# Patient Record
Sex: MALE | Race: WHITE | NOT HISPANIC OR LATINO | Employment: OTHER | URBAN - METROPOLITAN AREA
[De-identification: names, ages, dates, MRNs, and addresses within clinical notes are randomized per-mention and may not be internally consistent; named-entity substitution may affect disease eponyms.]

---

## 2023-03-15 DIAGNOSIS — B35.3 INFECTION, FUNGAL, LEFT FOOT: Primary | ICD-10-CM

## 2023-03-15 DIAGNOSIS — Z86.19 HISTORY OF ONYCHOMYCOSIS: ICD-10-CM

## 2023-03-15 RX ORDER — CICLOPIROX 80 MG/ML
SOLUTION TOPICAL ONCE
Status: DISCONTINUED | OUTPATIENT
Start: 2023-03-15 | End: 2023-03-27

## 2023-03-22 DIAGNOSIS — B35.3 TINEA PEDIS OF BOTH FEET: Primary | ICD-10-CM

## 2023-03-23 RX ORDER — CICLOPIROX 80 MG/ML
SOLUTION TOPICAL ONCE
Status: DISCONTINUED | OUTPATIENT
Start: 2023-03-23 | End: 2023-03-27

## 2023-03-27 DIAGNOSIS — B35.1 TOENAIL FUNGUS: Primary | ICD-10-CM

## 2023-03-27 RX ORDER — CICLOPIROX 80 MG/ML
SOLUTION TOPICAL ONCE
Status: DISCONTINUED | OUTPATIENT
Start: 2023-03-27 | End: 2023-04-04

## 2023-03-27 RX ORDER — MELOXICAM 15 MG/1
TABLET ORAL
COMMUNITY
Start: 2021-06-14 | End: 2023-10-04

## 2023-03-27 RX ORDER — SIMVASTATIN 40 MG/1
1 TABLET, FILM COATED ORAL DAILY
COMMUNITY
Start: 2019-06-05 | End: 2024-04-01 | Stop reason: SDUPTHER

## 2023-03-27 RX ORDER — VIT C/E/ZN/COPPR/LUTEIN/ZEAXAN 250MG-90MG
1 CAPSULE ORAL DAILY
COMMUNITY
Start: 2022-01-25

## 2023-03-27 RX ORDER — ASPIRIN 81 MG/1
1 TABLET ORAL DAILY
COMMUNITY
Start: 2022-01-25

## 2023-03-27 RX ORDER — LISINOPRIL 20 MG/1
1 TABLET ORAL DAILY
COMMUNITY
Start: 2014-10-29 | End: 2024-04-01 | Stop reason: SDUPTHER

## 2023-03-27 RX ORDER — PANTOPRAZOLE SODIUM 40 MG/1
1 TABLET, DELAYED RELEASE ORAL DAILY
COMMUNITY
Start: 2019-03-05 | End: 2023-06-05

## 2023-03-27 RX ORDER — CLOTRIMAZOLE AND BETAMETHASONE DIPROPIONATE 10; .64 MG/G; MG/G
CREAM TOPICAL 2 TIMES DAILY
COMMUNITY
Start: 2018-12-20 | End: 2024-01-22

## 2023-04-03 PROBLEM — J32.9 SINUSITIS: Status: ACTIVE | Noted: 2023-04-03

## 2023-04-03 PROBLEM — M25.552 HIP PAIN, ACUTE, LEFT: Status: ACTIVE | Noted: 2023-04-03

## 2023-04-03 PROBLEM — M54.9 LEFT-SIDED BACK PAIN: Status: ACTIVE | Noted: 2023-04-03

## 2023-04-03 PROBLEM — G47.00 INSOMNIA: Status: ACTIVE | Noted: 2023-04-03

## 2023-04-03 PROBLEM — R10.32 CHRONIC PAIN OF LEFT GROIN: Status: ACTIVE | Noted: 2023-04-03

## 2023-04-03 PROBLEM — R23.3 EASY BRUISING: Status: ACTIVE | Noted: 2023-04-03

## 2023-04-03 PROBLEM — N52.9 ERECTILE DYSFUNCTION: Status: ACTIVE | Noted: 2023-04-03

## 2023-04-03 PROBLEM — M16.9 DEGENERATIVE JOINT DISEASE (DJD) OF HIP: Status: ACTIVE | Noted: 2023-04-03

## 2023-04-03 PROBLEM — G89.29 CHRONIC PAIN OF LEFT GROIN: Status: ACTIVE | Noted: 2023-04-03

## 2023-04-03 PROBLEM — M79.672 FOOT PAIN, BILATERAL: Status: ACTIVE | Noted: 2023-04-03

## 2023-04-03 PROBLEM — J39.2 THROAT IRRITATION: Status: ACTIVE | Noted: 2023-04-03

## 2023-04-03 PROBLEM — G89.29 CHRONIC LOW BACK PAIN: Status: ACTIVE | Noted: 2023-04-03

## 2023-04-03 PROBLEM — M77.42 METATARSALGIA OF BOTH FEET: Status: ACTIVE | Noted: 2023-04-03

## 2023-04-03 PROBLEM — M10.9 GOUT: Status: ACTIVE | Noted: 2023-04-03

## 2023-04-03 PROBLEM — E55.9 VITAMIN D DEFICIENCY: Status: ACTIVE | Noted: 2023-04-03

## 2023-04-03 PROBLEM — S76.119A QUADRICEPS MUSCLE STRAIN: Status: ACTIVE | Noted: 2023-04-03

## 2023-04-03 PROBLEM — K21.00 GASTROESOPHAGEAL REFLUX DISEASE WITH ESOPHAGITIS: Status: ACTIVE | Noted: 2023-04-03

## 2023-04-03 PROBLEM — M54.16 LUMBAR NEURITIS: Status: ACTIVE | Noted: 2023-04-03

## 2023-04-03 PROBLEM — S16.1XXA CERVICAL STRAIN: Status: ACTIVE | Noted: 2023-04-03

## 2023-04-03 PROBLEM — I10 BENIGN HYPERTENSION: Status: ACTIVE | Noted: 2023-04-03

## 2023-04-03 PROBLEM — K40.20 BILATERAL INGUINAL HERNIA (BIH): Status: ACTIVE | Noted: 2023-04-03

## 2023-04-03 PROBLEM — S76.219A STRAIN OF GROIN: Status: ACTIVE | Noted: 2023-04-03

## 2023-04-03 PROBLEM — R21 RASH OF BACK: Status: ACTIVE | Noted: 2023-04-03

## 2023-04-03 PROBLEM — M16.0 OSTEOARTHRITIS, HIP, BILATERAL: Status: ACTIVE | Noted: 2023-04-03

## 2023-04-03 PROBLEM — R05.3 PERSISTENT COUGH: Status: ACTIVE | Noted: 2023-04-03

## 2023-04-03 PROBLEM — J06.9 UPPER RESPIRATORY INFECTION WITH COUGH AND CONGESTION: Status: ACTIVE | Noted: 2023-04-03

## 2023-04-03 PROBLEM — R52 PAIN OF LEFT SIDE OF BODY: Status: ACTIVE | Noted: 2023-04-03

## 2023-04-03 PROBLEM — M54.50 CHRONIC LOW BACK PAIN: Status: ACTIVE | Noted: 2023-04-03

## 2023-04-03 PROBLEM — I25.10 CORONARY ARTERY CALCIFICATION: Status: ACTIVE | Noted: 2023-04-03

## 2023-04-03 PROBLEM — M70.71 BURSITIS OF HIP, RIGHT: Status: ACTIVE | Noted: 2023-04-03

## 2023-04-03 PROBLEM — R21 GROIN RASH: Status: ACTIVE | Noted: 2023-04-03

## 2023-04-03 PROBLEM — D49.2 ABNORMAL SKIN GROWTH: Status: ACTIVE | Noted: 2023-04-03

## 2023-04-03 PROBLEM — J06.9 ACUTE URI: Status: ACTIVE | Noted: 2023-04-03

## 2023-04-03 PROBLEM — I25.84 CORONARY ARTERY CALCIFICATION: Status: ACTIVE | Noted: 2023-04-03

## 2023-04-03 PROBLEM — M17.11 RIGHT KNEE DJD: Status: ACTIVE | Noted: 2023-04-03

## 2023-04-03 PROBLEM — J20.9 ACUTE BRONCHITIS: Status: ACTIVE | Noted: 2023-04-03

## 2023-04-03 PROBLEM — S39.011A: Status: ACTIVE | Noted: 2023-04-03

## 2023-04-03 PROBLEM — R53.83 FATIGUE: Status: ACTIVE | Noted: 2023-04-03

## 2023-04-03 PROBLEM — S86.912A STRAIN OF LEFT KNEE AND LEG: Status: ACTIVE | Noted: 2023-04-03

## 2023-04-03 PROBLEM — H69.93 DYSFUNCTION OF BOTH EUSTACHIAN TUBES: Status: ACTIVE | Noted: 2023-04-03

## 2023-04-03 PROBLEM — S39.012A LUMBOSACRAL STRAIN: Status: ACTIVE | Noted: 2023-04-03

## 2023-04-03 PROBLEM — L08.9 INFECTION OF SKIN: Status: ACTIVE | Noted: 2023-04-03

## 2023-04-03 PROBLEM — E78.5 HYPERLIPIDEMIA: Status: ACTIVE | Noted: 2023-04-03

## 2023-04-03 PROBLEM — M77.8 HAND TENDONITIS: Status: ACTIVE | Noted: 2023-04-03

## 2023-04-03 PROBLEM — R93.1 ABNORMAL SCREENING CARDIAC CT: Status: ACTIVE | Noted: 2023-04-03

## 2023-04-03 PROBLEM — M51.9 LUMBOSACRAL DISC DISEASE: Status: ACTIVE | Noted: 2023-04-03

## 2023-04-03 PROBLEM — R07.89 ATYPICAL CHEST PAIN: Status: ACTIVE | Noted: 2023-04-03

## 2023-04-03 PROBLEM — L91.8 FIBROEPITHELIAL PAPILLOMA: Status: ACTIVE | Noted: 2023-04-03

## 2023-04-03 PROBLEM — M17.0 OSTEOARTHRITIS OF KNEES, BILATERAL: Status: ACTIVE | Noted: 2023-04-03

## 2023-04-03 PROBLEM — M51.26 LUMBAR HERNIATED DISC: Status: ACTIVE | Noted: 2023-04-03

## 2023-04-03 PROBLEM — K76.9 LIVER LESION: Status: ACTIVE | Noted: 2023-04-03

## 2023-04-03 PROBLEM — I10 ELEVATED BLOOD PRESSURE READING WITH DIAGNOSIS OF HYPERTENSION: Status: ACTIVE | Noted: 2023-04-03

## 2023-04-03 PROBLEM — K40.90 RIGHT INGUINAL HERNIA: Status: ACTIVE | Noted: 2023-04-03

## 2023-04-03 PROBLEM — Z96.642 STATUS POST LEFT HIP REPLACEMENT: Status: ACTIVE | Noted: 2023-04-03

## 2023-04-03 PROBLEM — R07.89 CHEST PAIN, ATYPICAL: Status: ACTIVE | Noted: 2023-04-03

## 2023-04-03 PROBLEM — J01.00 ACUTE MAXILLARY SINUSITIS: Status: ACTIVE | Noted: 2023-04-03

## 2023-04-03 PROBLEM — S20.212A: Status: ACTIVE | Noted: 2023-04-03

## 2023-04-03 PROBLEM — M79.671 FOOT PAIN, BILATERAL: Status: ACTIVE | Noted: 2023-04-03

## 2023-04-03 PROBLEM — M77.41 METATARSALGIA OF BOTH FEET: Status: ACTIVE | Noted: 2023-04-03

## 2023-04-03 PROBLEM — J40 BRONCHITIS: Status: ACTIVE | Noted: 2023-04-03

## 2023-04-03 PROBLEM — M54.16 ACUTE LUMBAR RADICULOPATHY: Status: ACTIVE | Noted: 2023-04-03

## 2023-04-03 PROBLEM — M17.12 PRIMARY OSTEOARTHRITIS OF LEFT KNEE: Status: ACTIVE | Noted: 2023-04-03

## 2023-04-03 PROBLEM — L98.9 SKIN LESION OF RIGHT LEG: Status: ACTIVE | Noted: 2023-04-03

## 2023-04-03 PROBLEM — M47.812 CERVICAL OSTEOARTHRITIS: Status: ACTIVE | Noted: 2023-04-03

## 2023-04-03 PROBLEM — M96.1 LUMBAR POSTLAMINECTOMY SYNDROME: Status: ACTIVE | Noted: 2023-04-03

## 2023-04-03 PROBLEM — E78.5 DYSLIPIDEMIA: Status: ACTIVE | Noted: 2023-04-03

## 2023-04-04 ENCOUNTER — OFFICE VISIT (OUTPATIENT)
Dept: PRIMARY CARE | Facility: CLINIC | Age: 73
End: 2023-04-04
Payer: MEDICARE

## 2023-04-04 VITALS
WEIGHT: 198 LBS | BODY MASS INDEX: 26.82 KG/M2 | TEMPERATURE: 98 F | HEART RATE: 66 BPM | RESPIRATION RATE: 18 BRPM | OXYGEN SATURATION: 94 % | SYSTOLIC BLOOD PRESSURE: 144 MMHG | DIASTOLIC BLOOD PRESSURE: 80 MMHG | HEIGHT: 72 IN

## 2023-04-04 DIAGNOSIS — E78.2 MIXED HYPERLIPIDEMIA: ICD-10-CM

## 2023-04-04 DIAGNOSIS — B35.1 TOENAIL FUNGUS: ICD-10-CM

## 2023-04-04 DIAGNOSIS — I10 BENIGN HYPERTENSION: ICD-10-CM

## 2023-04-04 DIAGNOSIS — R07.89 ATYPICAL CHEST PAIN: ICD-10-CM

## 2023-04-04 DIAGNOSIS — Z12.5 SCREENING PSA (PROSTATE SPECIFIC ANTIGEN): ICD-10-CM

## 2023-04-04 DIAGNOSIS — J01.00 ACUTE NON-RECURRENT MAXILLARY SINUSITIS: Primary | ICD-10-CM

## 2023-04-04 LAB
ALANINE AMINOTRANSFERASE (SGPT) (U/L) IN SER/PLAS: 12 U/L (ref 10–52)
ALBUMIN (G/DL) IN SER/PLAS: 4.2 G/DL (ref 3.4–5)
ALKALINE PHOSPHATASE (U/L) IN SER/PLAS: 35 U/L (ref 33–136)
ANION GAP IN SER/PLAS: 9 MMOL/L (ref 10–20)
ASPARTATE AMINOTRANSFERASE (SGOT) (U/L) IN SER/PLAS: 15 U/L (ref 9–39)
BILIRUBIN TOTAL (MG/DL) IN SER/PLAS: 0.7 MG/DL (ref 0–1.2)
CALCIUM (MG/DL) IN SER/PLAS: 9.2 MG/DL (ref 8.6–10.6)
CARBON DIOXIDE, TOTAL (MMOL/L) IN SER/PLAS: 31 MMOL/L (ref 21–32)
CHLORIDE (MMOL/L) IN SER/PLAS: 100 MMOL/L (ref 98–107)
CHOLESTEROL (MG/DL) IN SER/PLAS: 135 MG/DL (ref 0–199)
CHOLESTEROL IN HDL (MG/DL) IN SER/PLAS: 54.1 MG/DL
CHOLESTEROL/HDL RATIO: 2.5
CREATININE (MG/DL) IN SER/PLAS: 0.71 MG/DL (ref 0.5–1.3)
ERYTHROCYTE DISTRIBUTION WIDTH (RATIO) BY AUTOMATED COUNT: 12.9 % (ref 11.5–14.5)
ERYTHROCYTE MEAN CORPUSCULAR HEMOGLOBIN CONCENTRATION (G/DL) BY AUTOMATED: 33 G/DL (ref 32–36)
ERYTHROCYTE MEAN CORPUSCULAR VOLUME (FL) BY AUTOMATED COUNT: 93 FL (ref 80–100)
ERYTHROCYTES (10*6/UL) IN BLOOD BY AUTOMATED COUNT: 4.98 X10E12/L (ref 4.5–5.9)
GFR MALE: >90 ML/MIN/1.73M2
GLUCOSE (MG/DL) IN SER/PLAS: 96 MG/DL (ref 74–99)
HEMATOCRIT (%) IN BLOOD BY AUTOMATED COUNT: 46.3 % (ref 41–52)
HEMOGLOBIN (G/DL) IN BLOOD: 15.3 G/DL (ref 13.5–17.5)
LDL: 66 MG/DL (ref 0–99)
LEUKOCYTES (10*3/UL) IN BLOOD BY AUTOMATED COUNT: 7.5 X10E9/L (ref 4.4–11.3)
NRBC (PER 100 WBCS) BY AUTOMATED COUNT: 0 /100 WBC (ref 0–0)
PLATELETS (10*3/UL) IN BLOOD AUTOMATED COUNT: 172 X10E9/L (ref 150–450)
POTASSIUM (MMOL/L) IN SER/PLAS: 4.3 MMOL/L (ref 3.5–5.3)
PROSTATE SPECIFIC ANTIGEN,SCREEN: 1.81 NG/ML (ref 0–4)
PROTEIN TOTAL: 6.2 G/DL (ref 6.4–8.2)
SODIUM (MMOL/L) IN SER/PLAS: 136 MMOL/L (ref 136–145)
THYROTROPIN (MIU/L) IN SER/PLAS BY DETECTION LIMIT <= 0.05 MIU/L: 0.73 MIU/L (ref 0.44–3.98)
TRIGLYCERIDE (MG/DL) IN SER/PLAS: 75 MG/DL (ref 0–149)
UREA NITROGEN (MG/DL) IN SER/PLAS: 15 MG/DL (ref 6–23)
VLDL: 15 MG/DL (ref 0–40)

## 2023-04-04 PROCEDURE — 80061 LIPID PANEL: CPT

## 2023-04-04 PROCEDURE — 36415 COLL VENOUS BLD VENIPUNCTURE: CPT | Performed by: FAMILY MEDICINE

## 2023-04-04 PROCEDURE — 1036F TOBACCO NON-USER: CPT | Performed by: FAMILY MEDICINE

## 2023-04-04 PROCEDURE — 1159F MED LIST DOCD IN RCRD: CPT | Performed by: FAMILY MEDICINE

## 2023-04-04 PROCEDURE — 84153 ASSAY OF PSA TOTAL: CPT

## 2023-04-04 PROCEDURE — 1160F RVW MEDS BY RX/DR IN RCRD: CPT | Performed by: FAMILY MEDICINE

## 2023-04-04 PROCEDURE — 80053 COMPREHEN METABOLIC PANEL: CPT

## 2023-04-04 PROCEDURE — 3077F SYST BP >= 140 MM HG: CPT | Performed by: FAMILY MEDICINE

## 2023-04-04 PROCEDURE — 99214 OFFICE O/P EST MOD 30 MIN: CPT | Performed by: FAMILY MEDICINE

## 2023-04-04 PROCEDURE — 3079F DIAST BP 80-89 MM HG: CPT | Performed by: FAMILY MEDICINE

## 2023-04-04 PROCEDURE — 85027 COMPLETE CBC AUTOMATED: CPT

## 2023-04-04 PROCEDURE — 84443 ASSAY THYROID STIM HORMONE: CPT

## 2023-04-04 RX ORDER — CEPHALEXIN 500 MG/1
500 CAPSULE ORAL 2 TIMES DAILY
Qty: 14 CAPSULE | Refills: 0 | Status: SHIPPED | OUTPATIENT
Start: 2023-04-04 | End: 2023-04-11

## 2023-04-04 RX ORDER — CICLOPIROX 80 MG/ML
SOLUTION TOPICAL ONCE
Status: DISCONTINUED | OUTPATIENT
Start: 2023-04-04 | End: 2023-04-04

## 2023-04-04 ASSESSMENT — PAIN SCALES - GENERAL: PAINLEVEL: 0-NO PAIN

## 2023-04-04 NOTE — PROGRESS NOTES
Subjective   Edilson Live is a 72 y.o. male who presents for Follow-up (Patient complains of foot fungus, also has sinus problems).  HPI  ; patient is a 72-year-old male who is in with a few different complaints and problems, he needs his blood work rechecked, he wants to discuss a toenail fungus that he has, and he has sinus congestion and drainage.  Patient also recently saw pain management and did have a nerve block for his low back problem and he states it did help.  He has a follow-up appointment with pain management next week.  Patient did have previous lumbar spine surgery and also history of a hip replacement surgery.  He states his back was bothering him when he played golf and he is hoping the nerve block will help.      Objective ROS ; 10 systems were reviewed and the information is included in the HPI and no additional review of systems is indicated.    Physical Exam  Vitals and nursing note reviewed.   Constitutional:       Appearance: Normal appearance. He is normal weight.      Comments: Patient is alert and oriented in no acute distress.  Does have some sinus congestion and drainage.   HENT:      Head: Normocephalic.      Right Ear: Tympanic membrane and external ear normal.      Left Ear: Tympanic membrane and external ear normal.      Nose: Congestion present.      Comments: Nasal sinus congestion and sinusitis.     Mouth/Throat:      Mouth: Mucous membranes are moist.      Pharynx: Posterior oropharyngeal erythema present.      Comments: Posterior pharyngeal irritation and drainage.  Eyes:      Extraocular Movements: Extraocular movements intact.      Conjunctiva/sclera: Conjunctivae normal.      Pupils: Pupils are equal, round, and reactive to light.   Neck:      Comments: Occasional neck spasm and restriction of motion secondary to stress and tension.  Cardiovascular:      Rate and Rhythm: Normal rate and regular rhythm.      Pulses: Normal pulses.      Heart sounds: Normal heart sounds.       Comments: Heart rhythm is stable S1 and S2 are noted, no ectopics.  Pulmonary:      Effort: Pulmonary effort is normal.      Comments: Lungs are clear to auscultation.    Abdominal:      General: Bowel sounds are normal.      Palpations: Abdomen is soft.      Comments: Abdomen is soft and nontender, no masses noted.   Genitourinary:     Comments: Not examined  Musculoskeletal:         General: Normal range of motion.      Cervical back: Normal range of motion.      Comments: History of lumbar spine surgery many years ago, history of left hip replacement and stable.  Patient is seeing pain management for his low back pain which interferes with his golf game.   Skin:     General: Skin is warm.   Neurological:      General: No focal deficit present.      Mental Status: He is alert and oriented to person, place, and time. Mental status is at baseline.      Comments: No neurosensory deficits are noted.  Normal reflexes upper and lower extremities.   Psychiatric:         Mood and Affect: Mood normal.         Behavior: Behavior normal.         Thought Content: Thought content normal.         Judgment: Judgment normal.      Comments: Normal mood and affect.  Normal thought content and judgment.  No anxiety or depression.     PLAN  ; patient is a 72-year-old male who was evaluated for recheck on blood work, toenail fungus, and sinusitis.  He will be notified of his blood results in 4 days and further recommendations will be made at that time.  He also was prescribed a topical treatment for toenail fungus, Penlac.  Patient also had some sinus congestion with upper respiratory congestion and was prescribed Keflex 500 mg twice daily.  He otherwise is stable and will follow-up as needed pending the blood work results.    Problem List Items Addressed This Visit          Infectious/Inflammatory    Acute maxillary sinusitis - Primary    Relevant Medications    cephalexin (Keflex) 500 mg capsule     Other Visit Diagnoses        Benign hypertension        Relevant Orders    CBC    Comprehensive Metabolic Panel    Lipid Panel    Thyroid Stimulating Hormone    Mixed hyperlipidemia        Relevant Orders    CBC    Comprehensive Metabolic Panel    Atypical chest pain        Relevant Orders    CBC    Comprehensive Metabolic Panel    Lipid Panel    Thyroid Stimulating Hormone    Screening PSA (prostate specific antigen)        Relevant Orders    Prostate Specific Antigen, Screen    Toenail fungus                     Tanmay Rosas DO

## 2023-04-07 NOTE — RESULT ENCOUNTER NOTE
Sugar, kidney and liver function were all normal           red and white blood cell counts were normal             prostate level was normal at 1.81,    under 4 is normal          cholesterol was normal at 135 and triglycerides are normal at 75      just the protein level was a little bit low at 6.2     should be above 6.4 but I would not worry about that         overall blood work looks very good      keep up the good work.

## 2023-05-10 ENCOUNTER — OFFICE VISIT (OUTPATIENT)
Dept: PRIMARY CARE | Facility: CLINIC | Age: 73
End: 2023-05-10
Payer: MEDICARE

## 2023-05-10 VITALS
OXYGEN SATURATION: 93 % | TEMPERATURE: 99 F | DIASTOLIC BLOOD PRESSURE: 80 MMHG | WEIGHT: 204 LBS | HEART RATE: 78 BPM | BODY MASS INDEX: 27.63 KG/M2 | SYSTOLIC BLOOD PRESSURE: 134 MMHG | RESPIRATION RATE: 18 BRPM | HEIGHT: 72 IN

## 2023-05-10 DIAGNOSIS — M51.9 LUMBOSACRAL DISC DISEASE: Primary | ICD-10-CM

## 2023-05-10 DIAGNOSIS — M96.1 LUMBAR POSTLAMINECTOMY SYNDROME: ICD-10-CM

## 2023-05-10 PROCEDURE — 1159F MED LIST DOCD IN RCRD: CPT | Performed by: FAMILY MEDICINE

## 2023-05-10 PROCEDURE — 3079F DIAST BP 80-89 MM HG: CPT | Performed by: FAMILY MEDICINE

## 2023-05-10 PROCEDURE — 3075F SYST BP GE 130 - 139MM HG: CPT | Performed by: FAMILY MEDICINE

## 2023-05-10 PROCEDURE — 1036F TOBACCO NON-USER: CPT | Performed by: FAMILY MEDICINE

## 2023-05-10 PROCEDURE — 1160F RVW MEDS BY RX/DR IN RCRD: CPT | Performed by: FAMILY MEDICINE

## 2023-05-10 PROCEDURE — 99214 OFFICE O/P EST MOD 30 MIN: CPT | Performed by: FAMILY MEDICINE

## 2023-05-10 RX ORDER — CICLOPIROX 80 MG/ML
SOLUTION TOPICAL
COMMUNITY
Start: 2023-04-04

## 2023-05-10 ASSESSMENT — PAIN SCALES - GENERAL: PAINLEVEL: 0-NO PAIN

## 2023-05-10 NOTE — PROGRESS NOTES
Subjective   Edilson Live is a 72 y.o. male who presents for Follow-up (Pt reports lower back pain since 11/2022.).    HPI  : Patient is a 72-year-old male who is being evaluated today for his back pain.  Patient had lumbar back surgery in 2016 and has been stable until recently.  I did see him last November and sent him for physical therapy which she started on about November 23 and continued through March 22.  He also was referred to pain management and they did perform a lumbar nerve block which did give him some relief.  He states lately he has been playing more golf and he has been having more back problems with some right lumbar pain and right SI joint pain and radiation to the right buttocks.  Patient will be sent and scheduled for an MRI scan on to review to evaluate his problem since physical therapy did not help at his 1st nerve block provided some relief but he cannot have another nerve block until July.  Patient otherwise has been quite stable and tries to do stretching exercises and the exercises he was given from physical therapy.  He is a very active person does exercise at the recreation center and likes to play golf however lately his back has limited his activities.  Patient also wants to review his blood results from April 4 of this year.      Objective  :  10 systems were reviewed and the information is included in the HPI and no additional review of systems is indicated.    Physical Exam  Vitals and nursing note reviewed.   Constitutional:       Appearance: Normal appearance. He is normal weight.      Comments: Patient is alert and oriented in mild distress with right lumbar back pain.   HENT:      Head: Normocephalic.      Right Ear: Tympanic membrane and external ear normal.      Left Ear: Tympanic membrane and external ear normal.      Nose: Nose normal.      Mouth/Throat:      Mouth: Mucous membranes are moist.      Pharynx: Oropharynx is clear.   Eyes:      Extraocular Movements: Extraocular  movements intact.      Conjunctiva/sclera: Conjunctivae normal.      Pupils: Pupils are equal, round, and reactive to light.   Neck:      Comments: Occasional neck spasm and restriction of motion secondary to mild arthritis stress and tension.  Cardiovascular:      Rate and Rhythm: Normal rate and regular rhythm.      Pulses: Normal pulses.      Heart sounds: Normal heart sounds.      Comments: Heart rhythm is stable S1 and S2 are noted, no ectopics.  Patient denies chest pain or palpitations.  Pulmonary:      Effort: Pulmonary effort is normal.      Comments: Lungs are clear to auscultation.  Patient denies productive cough or hemoptysis.  No wheezing or congestion noted.  Abdominal:      General: Abdomen is flat. Bowel sounds are normal.      Palpations: Abdomen is soft.      Comments: Abdomen is soft and nontender, no hepatosplenomegaly.  Patient denies any flank pain, no abdominal rebound tenderness or guarding.   Genitourinary:     Comments: Not examined  Musculoskeletal:         General: Normal range of motion.      Cervical back: Normal range of motion.      Comments: Patient has restriction of motion lumbar spine more so on the right L3-L4-L5 with some radiation of pain to the right sacral iliac joint.  Patient has been in physical therapy from November 23 through March 22 and he still is having some problems especially when he plays golf.  He did have one nerve block which helped.  He is concerned about the problem and wants to find out what needs to be done to fix it.  MRI scan was ordered.   Skin:     General: Skin is warm.   Neurological:      General: No focal deficit present.      Mental Status: He is alert and oriented to person, place, and time. Mental status is at baseline.      Comments: Patient has some right lumbar pain which radiates to the right SI joint and right buttocks.  There is no peripheral neuropathy noted.  No extensor hallux is weakness noted.  Patient ambulates with stable condition.   He does have some positive straight leg raising on the right.  Physical therapy helped somewhat but he needs further evaluation with MRI scan.   Psychiatric:         Mood and Affect: Mood normal.         Behavior: Behavior normal.         Thought Content: Thought content normal.         Judgment: Judgment normal.      Comments: Normal mood and affect.  Normal thought content and decision making.  Denies anxiety or depression.     PLAN  : Patient is a 72-year-old male who was evaluated today primarily to review his previous blood results and also due to his back pain.  He has completed physical therapy from November 23 through March 22 with some relief but he still has pain in the right lower lumbar region with radiation to the right SI joint and right buttocks.  Patient had 1 spinal nerve block from pain management which did help somewhat however every time he plays golf it reoccurs.  He had previous back surgery in 2016 with good results.  He will need to have an MRI scan to further evaluate this problem so that a decision can be made whether he needs any further surgery or stick with the spinal blocks.  I did send a requisition for an MRI scan and we will wait and see if it is authorized.  He does have positive straight leg raising on the right side.  He has had to limit his activities due to the pain and discomfort.  Further recommendations will be made after the MRI scan is completed.    Problem List Items Addressed This Visit    None           Tanmay Rosas,

## 2023-05-30 DIAGNOSIS — R10.32 CHRONIC PAIN OF LEFT GROIN: ICD-10-CM

## 2023-05-30 DIAGNOSIS — G89.29 CHRONIC PAIN OF LEFT GROIN: ICD-10-CM

## 2023-06-04 DIAGNOSIS — K21.00 GASTRO-ESOPHAGEAL REFLUX DISEASE WITH ESOPHAGITIS, WITHOUT BLEEDING: ICD-10-CM

## 2023-06-05 RX ORDER — PANTOPRAZOLE SODIUM 40 MG/1
TABLET, DELAYED RELEASE ORAL
Qty: 90 TABLET | Refills: 1 | Status: SHIPPED | OUTPATIENT
Start: 2023-06-05 | End: 2024-01-22

## 2023-06-07 DIAGNOSIS — M51.9 LUMBOSACRAL DISC DISEASE: Primary | ICD-10-CM

## 2023-06-07 NOTE — RESULT ENCOUNTER NOTE
I discussed the results with the patient of the MRI.    And I will send a copy to Dr. Duke , pain management.  I also gave the patient a referral to neurosurgery.

## 2023-06-13 ENCOUNTER — APPOINTMENT (OUTPATIENT)
Dept: PRIMARY CARE | Facility: CLINIC | Age: 73
End: 2023-06-13
Payer: MEDICARE

## 2023-06-23 ENCOUNTER — HOSPITAL ENCOUNTER (OUTPATIENT)
Dept: DATA CONVERSION | Facility: HOSPITAL | Age: 73
End: 2023-06-23
Attending: ANESTHESIOLOGY | Admitting: ANESTHESIOLOGY
Payer: MEDICARE

## 2023-06-23 DIAGNOSIS — Z79.82 LONG TERM (CURRENT) USE OF ASPIRIN: ICD-10-CM

## 2023-06-23 DIAGNOSIS — M54.16 RADICULOPATHY, LUMBAR REGION: ICD-10-CM

## 2023-06-23 DIAGNOSIS — M54.50 LOW BACK PAIN, UNSPECIFIED: ICD-10-CM

## 2023-09-05 ENCOUNTER — OFFICE VISIT (OUTPATIENT)
Dept: PRIMARY CARE | Facility: CLINIC | Age: 73
End: 2023-09-05
Payer: MEDICARE

## 2023-09-05 VITALS
HEIGHT: 72 IN | TEMPERATURE: 98.4 F | DIASTOLIC BLOOD PRESSURE: 72 MMHG | SYSTOLIC BLOOD PRESSURE: 138 MMHG | RESPIRATION RATE: 18 BRPM | OXYGEN SATURATION: 93 % | WEIGHT: 196 LBS | BODY MASS INDEX: 26.55 KG/M2 | HEART RATE: 75 BPM

## 2023-09-05 DIAGNOSIS — M51.9 LUMBOSACRAL DISC DISEASE: ICD-10-CM

## 2023-09-05 DIAGNOSIS — R21 RASH: ICD-10-CM

## 2023-09-05 DIAGNOSIS — T14.8XXA BRUISING: ICD-10-CM

## 2023-09-05 DIAGNOSIS — Z96.642 STATUS POST LEFT HIP REPLACEMENT: ICD-10-CM

## 2023-09-05 DIAGNOSIS — I10 PRIMARY HYPERTENSION: ICD-10-CM

## 2023-09-05 DIAGNOSIS — S46.912A SHOULDER STRAIN, LEFT, INITIAL ENCOUNTER: Primary | ICD-10-CM

## 2023-09-05 DIAGNOSIS — E78.2 MIXED HYPERLIPIDEMIA: ICD-10-CM

## 2023-09-05 DIAGNOSIS — M17.12 PRIMARY OSTEOARTHRITIS OF LEFT KNEE: ICD-10-CM

## 2023-09-05 DIAGNOSIS — M96.1 LUMBAR POSTLAMINECTOMY SYNDROME: ICD-10-CM

## 2023-09-05 PROCEDURE — 1036F TOBACCO NON-USER: CPT | Performed by: FAMILY MEDICINE

## 2023-09-05 PROCEDURE — 1126F AMNT PAIN NOTED NONE PRSNT: CPT | Performed by: FAMILY MEDICINE

## 2023-09-05 PROCEDURE — 3075F SYST BP GE 130 - 139MM HG: CPT | Performed by: FAMILY MEDICINE

## 2023-09-05 PROCEDURE — 1160F RVW MEDS BY RX/DR IN RCRD: CPT | Performed by: FAMILY MEDICINE

## 2023-09-05 PROCEDURE — 1159F MED LIST DOCD IN RCRD: CPT | Performed by: FAMILY MEDICINE

## 2023-09-05 PROCEDURE — 3078F DIAST BP <80 MM HG: CPT | Performed by: FAMILY MEDICINE

## 2023-09-05 PROCEDURE — 99214 OFFICE O/P EST MOD 30 MIN: CPT | Performed by: FAMILY MEDICINE

## 2023-09-05 RX ORDER — CLOTRIMAZOLE AND BETAMETHASONE DIPROPIONATE 10; .64 MG/G; MG/G
CREAM TOPICAL DAILY
Status: CANCELLED | OUTPATIENT
Start: 2023-09-05

## 2023-09-05 ASSESSMENT — PAIN SCALES - GENERAL: PAINLEVEL: 0-NO PAIN

## 2023-09-05 NOTE — PROGRESS NOTES
Subjective   Edilson Live is a 73 y.o. male who presents for Follow-up (Patient complains of left shoulder discomfort).    HPI ; patient is a 73-year-old male who is being evaluated for left shoulder strain or sprain, he also recently had spinal nerve blocks for his lumbosacral disc disease.  He was also referred to a neurosurgeon Dr. Spicer for further evaluation and has a follow-up appointment soon.  Patient has been playing golf and he states occasionally his left shoulder gives him some pain and discomfort.  He states it is slowly improving with stretching and strengthening exercises and he does not think he needs a cortisone shot.  He also is seeing pain management for his lumbosacral disc disease and has an upcoming follow-up appointment with neurosurgery to see if he needs any other treatment.  Patient will have his recheck on blood work done in October when he comes back for his follow-up appointment.      Objective  : ROS :10 systems were reviewed and the information is included in the HPI and no additional review of systems is indicated.    Physical Exam  Vitals and nursing note reviewed.   Constitutional:       Appearance: Normal appearance. He is normal weight.      Comments: Patient is alert and oriented x3.   No acute distress.   HENT:      Head: Normocephalic.      Right Ear: Tympanic membrane and external ear normal.      Left Ear: Tympanic membrane and external ear normal.      Ears:      Comments: Ears are patent bilaterally and TMs are clear.     Nose: Nose normal.      Mouth/Throat:      Mouth: Mucous membranes are moist.      Pharynx: Oropharynx is clear.      Comments: Mouth is moist, tongue is midline.  No posterior pharyngeal erythema.  Eyes:      Extraocular Movements: Extraocular movements intact.      Conjunctiva/sclera: Conjunctivae normal.      Pupils: Pupils are equal, round, and reactive to light.      Comments: No visual disturbance, does have her eyes examined once a year.   Neck:       Comments: No carotid bruits, no thyromegaly, no cervical adenopathy.  Occasional neck spasm and restriction of motion secondary to mild arthritis, and muscle spasm.  Cardiovascular:      Rate and Rhythm: Normal rate and regular rhythm.      Pulses: Normal pulses.      Heart sounds: Normal heart sounds.      Comments: Patient denies chest pain and no palpitations.  Heart rhythm is stable S1 and S2 are noted, no ectopics.  Pulmonary:      Effort: Pulmonary effort is normal.      Breath sounds: Normal breath sounds.      Comments: Patient denies any coughing or wheezing.  Lungs are clear to auscultation.    Abdominal:      General: Bowel sounds are normal.      Palpations: Abdomen is soft.      Comments: Abdomen is soft and nontender, no hepatosplenomegaly.  Occasionally has some esophageal reflux.  No flank tenderness.  No suprapubic pain.  Positive bowel sounds x4.  No abdominal guarding and no rebound tenderness.   Genitourinary:     Comments: Patient denies dysuria, no hematuria, denies flank pain.  Occasional nocturia.  Musculoskeletal:         General: Tenderness present.      Cervical back: Normal range of motion.      Comments: Past history of lumbar spine surgery probably 8 years ago.  Currently seeing pain management and did see neurosurgery for consultation.  The physical therapy and nerve blocks did seem to help his low back pain.  He also has some left shoulder strain which exercises seem to be helping.  Age-related arthritis in the joints.  Restriction of motion cervical and lumbar spines due to arthritis, degenerative disc disease, and muscle spasm.  He did have a recent MRI scan that we did review of his lumbar spine.   Skin:     General: Skin is warm.      Findings: Bruising present.      Comments: Patient does seem to be bruising more and has several skin tears since he has gotten a little bit older and he does take a baby aspirin daily.  We will recheck his platelet count when he comes back in  October.  Patient is also very fair complected.   Neurological:      General: No focal deficit present.      Mental Status: He is alert and oriented to person, place, and time. Mental status is at baseline.      Comments: No focal neurosensory deficits are noted.  Patient denies any peripheral neuropathy.  Coordination and gait are stable.  Normal muscle strength upper and lower extremities.  Patient does have some sensory changes left lower leg from previous surgery on the lumbar spine.   Psychiatric:         Mood and Affect: Mood normal.         Behavior: Behavior normal.         Thought Content: Thought content normal.         Judgment: Judgment normal.      Comments: Patient has normal mood and affect.  Thought content and judgment are stable.  No signs of vascular dementia.  Behavior is normal.     PLAN : Patient is a 73-year-old male who was evaluated today for several concerns and problems.  He did have a left shoulder strain which probably occurred with his several recent golf outings.  He states he is doing some stretching exercises for the shoulder That Seems to Be Improving.  He Does Have Good Muscle Strength and He Does Not Need a Cortisone Shot or X-Rays at This Time.  He Also Had Some Right Infrapatellar Tendinitis.  He Can Rub Some Aspercreme with Lidocaine on the Right Knee Area and Hopefully That Will Continue to Improve.  He Also Had Several Small Skin Tears and Bruises and His Last Platelet Count Was a Little Bit Low.  This Will Be Rechecked in October When He Comes Back for His Blood Work.  Patient Also Is Seeing Pain Management for His Low Back Problems and We Did Review His Recent MRI scan and he does have a upcoming follow-up appointment with neurosurgery.  Patient seems to be stable otherwise and he was given some stretching exercises and he will return in October for his blood recheck.  Blood pressure and other vitals are stable.    Problem List Items Addressed This Visit       Status post  left hip replacement    Lumbar postlaminectomy syndrome    Lumbosacral disc disease    Primary osteoarthritis of left knee    Hyperlipidemia    Primary hypertension    Shoulder strain, left, initial encounter - Primary            Tanmay Rosas DO

## 2023-09-07 ENCOUNTER — APPOINTMENT (OUTPATIENT)
Dept: PRIMARY CARE | Facility: CLINIC | Age: 73
End: 2023-09-07
Payer: MEDICARE

## 2023-09-07 VITALS — BODY MASS INDEX: 27.69 KG/M2 | HEIGHT: 72 IN

## 2023-10-04 ENCOUNTER — OFFICE VISIT (OUTPATIENT)
Dept: PAIN MEDICINE | Facility: CLINIC | Age: 73
End: 2023-10-04
Payer: MEDICARE

## 2023-10-04 VITALS
BODY MASS INDEX: 26.68 KG/M2 | WEIGHT: 197 LBS | DIASTOLIC BLOOD PRESSURE: 84 MMHG | TEMPERATURE: 96.1 F | HEIGHT: 72 IN | HEART RATE: 80 BPM | SYSTOLIC BLOOD PRESSURE: 172 MMHG

## 2023-10-04 DIAGNOSIS — M51.26 LUMBAR HERNIATED DISC: ICD-10-CM

## 2023-10-04 DIAGNOSIS — M54.16 LUMBAR NEURITIS: ICD-10-CM

## 2023-10-04 DIAGNOSIS — M51.9 LUMBOSACRAL DISC DISEASE: Primary | ICD-10-CM

## 2023-10-04 PROCEDURE — 99214 OFFICE O/P EST MOD 30 MIN: CPT | Performed by: ANESTHESIOLOGY

## 2023-10-04 ASSESSMENT — PATIENT HEALTH QUESTIONNAIRE - PHQ9
2. FEELING DOWN, DEPRESSED OR HOPELESS: NOT AT ALL
1. LITTLE INTEREST OR PLEASURE IN DOING THINGS: NOT AT ALL
SUM OF ALL RESPONSES TO PHQ9 QUESTIONS 1 AND 2: 0

## 2023-10-04 ASSESSMENT — COLUMBIA-SUICIDE SEVERITY RATING SCALE - C-SSRS
6. HAVE YOU EVER DONE ANYTHING, STARTED TO DO ANYTHING, OR PREPARED TO DO ANYTHING TO END YOUR LIFE?: NO
1. IN THE PAST MONTH, HAVE YOU WISHED YOU WERE DEAD OR WISHED YOU COULD GO TO SLEEP AND NOT WAKE UP?: NO
2. HAVE YOU ACTUALLY HAD ANY THOUGHTS OF KILLING YOURSELF?: NO

## 2023-10-04 ASSESSMENT — PAIN SCALES - GENERAL: PAINLEVEL: 1

## 2023-10-04 NOTE — PROGRESS NOTES
History Of Present Illness  Edilson Live is a 73 y.o. male presenting with   Chief Complaint   Patient presents with    Back Pain     Right low back pain to the right hip down the thigh.     Patient returns for follow up for ONGOING IMPROVED chronic low back pain to the RLE thru his buttock and down the lateral right knee.    Recall: He is s/p below lumbar surgery Dr. Bernstein in 2016.     The pain is constant, worse with activity and better with rest. The pain is sharp, stabbing and shooting to the B/L LE. Denies LE paresthesias, weakness, saddle anesthesia, bowel or bladder incontinence. To manage this pain the patient has attempted Advil with 10-20% relief of his pain. The patients chronic GERD, HTN, DLD are stable.     PSHx  -Laminectomy, facetectomy and foraminotomy with Dr. Bernstein in 2016    -LTHA (Dr. Lopez)  -Hernia repair   -Left knee arthroscopy (Dr. Lopez)     SocHx  -Denies any tobacco  -Worked desk work in Finance at [employer]    PAIN SCORE: 5/10    Previous Pain: Dr. Guzman ([employer])     Past Medical History  He has a past medical history of Acute upper respiratory infection, unspecified (11/20/2017), Bicipital tendinitis, left shoulder, Effusion, right knee, Encounter for removal of sutures, Other benign neoplasm of skin of scalp and neck, Other conditions influencing health status, Other conditions influencing health status, Other specified disorders of the skin and subcutaneous tissue, Overweight, Personal history of other (healed) physical injury and trauma, Personal history of other diseases of the circulatory system, Personal history of other diseases of the musculoskeletal system and connective tissue, Personal history of other diseases of the respiratory system, Radiculopathy, cervical region, Reaction to severe stress, unspecified, Sprain of unspecified parts of thorax, initial encounter, and Strain of muscle, fascia and tendon at neck level, initial encounter (07/10/2018).    Surgical History  He  has a past surgical history that includes Other surgical history (01/07/2020); Other surgical history (01/07/2020); Other surgical history (01/07/2020); and Knee arthroscopy w/ debridement (07/12/2016).     Social History  He reports that he has never smoked. He has never been exposed to tobacco smoke. He has never used smokeless tobacco. He reports that he does not currently use alcohol after a past usage of about 5.0 standard drinks of alcohol per week. He reports that he does not use drugs.    Family History  Family History   Problem Relation Name Age of Onset    Arthritis Other multiple family members     Diabetes Other multiple family members     Hypertension Other multiple family members     Stroke Other multiple family members     Other (cardiac disorder) Other multiple family members     Lung disease Other multiple family members         Allergies  Chlorhexidin-isopropyl alcohol    Review of Systems     Physical Exam     Last Recorded Vitals  /84   Pulse 80   Temp 35.6 °C (96.1 °F)   Wt 89.4 kg (197 lb)     General: Pt appears stated age    Eyes: Conjunctiva non-icteric and lids without obvious rash or drooping. Pupils are symmetric    ENT: External ears are without deformity or rash. Hearing is grossly intact    Neck: No JVD noted, tracheal position midline.     Respiratory: No gasping or shortness of breath noted, no use of accessory muscles noted    Cardiovascular: Extremities show no edema or varicosities    Skin: No rashes or open lesions/ulcers identified on skin.     Musculoskeletal: Gait is grossly normal    Digits/nails show no clubbing or cyanosis    Exam of muscles/joints/bones shows no gross atrophy and no abnormal/involuntary movements in the head/neckNo asymmetry or masses noted in the head/neck    Stability: no subluxation noted on movement of bilateral upper extremities or head/neck    Strength: 4/5 in RLE , 5/5 in LLE     Range of Motion: WNL     Sensation: In tact     Cranial  nerves 2-12 are grossly intact    Psychiatric: Pt is alert and oriented to time, place and person.         Assessment/Plan   1. Lumbosacral disc disease        2. Lumbar neuritis        3. Lumbar herniated disc            1. I have previously provided the patient with a list of physical therapy exercises to learn and perform to strengthen core, maintain stabilization, and reduce pain. We reviewed the exercises in detail and I encouraged them to perform them on a regular basis.    Pt reports therapy has been working very well for him and he continues on this.     2. I did previously discuss starting him on Gabapentin to help with nerve related pain. We discussed the risks, benefits, and side effects to this medication including the mechanism of action and the pt understands and will hold off for now.     3. I extensively reviewed the patients MRI findings (2016 prior to surgery) in detail, including review of the actual images and provided a detailed explanation of the findings using a spine model.     There is mild diffuse disc bulge at the L3/4 level.     There is diffuse disc bulge and moderate to severe bilateral neural foraminal stenosis. There is also noted a 6mm perineural cyst at the L4 nerve root level.    At the L5/S1 moderate right and moderate to severe left neural foraminal stenosis and suspected pars defects at L5 level.     4. The patient is a candidate for an CAUDAL vs to treat back and radicular pain. I spent time with the patient discussing all of the risks, benefits, and alternatives to this measure. Including but not limited to spinal infection, epidural hematoma/abscess, paralysis, nerve injury, steroid effects, and spinal headache. The patient understands and agrees.     He underwent his injection on 3- and 60% relief of his pain that is ONGOING for now. He has been able to stand / walk / bend with much less pain.     5. I reviewed his MRI from [facility] from 5-2023.     I spent time with  the patient reviewing their imaging and discussing the risks benefits and alternatives to the above plan. A total of 30 minutes was spent reviewing the data and greater than 50% of that time was with the patient during the face to face encounter discussing treatment options both surgical, non-surgical, and minimally invasive techniques.      Vicente Duke MD

## 2023-11-22 ENCOUNTER — TELEPHONE (OUTPATIENT)
Dept: PRIMARY CARE | Facility: CLINIC | Age: 73
End: 2023-11-22
Payer: MEDICARE

## 2023-11-22 NOTE — TELEPHONE ENCOUNTER
Patient lvm today stating he has cough, congestion, sore throat. Would like to know if you could send something to his pharmacy ?

## 2023-11-24 DIAGNOSIS — J06.9 URI WITH COUGH AND CONGESTION: Primary | ICD-10-CM

## 2023-11-24 RX ORDER — BROMPHENIRAMINE MALEATE, PSEUDOEPHEDRINE HYDROCHLORIDE, AND DEXTROMETHORPHAN HYDROBROMIDE 2; 30; 10 MG/5ML; MG/5ML; MG/5ML
5 SYRUP ORAL 4 TIMES DAILY PRN
Qty: 120 ML | Refills: 0 | Status: SHIPPED | OUTPATIENT
Start: 2023-11-24 | End: 2023-12-04

## 2023-11-24 RX ORDER — AMOXICILLIN AND CLAVULANATE POTASSIUM 500; 125 MG/1; MG/1
500 TABLET, FILM COATED ORAL 3 TIMES DAILY
Qty: 30 TABLET | Refills: 0 | Status: SHIPPED | OUTPATIENT
Start: 2023-11-24 | End: 2023-12-04

## 2023-12-05 ENCOUNTER — OFFICE VISIT (OUTPATIENT)
Dept: PRIMARY CARE | Facility: CLINIC | Age: 73
End: 2023-12-05
Payer: MEDICARE

## 2023-12-05 VITALS
WEIGHT: 198 LBS | HEIGHT: 72 IN | OXYGEN SATURATION: 94 % | HEART RATE: 74 BPM | TEMPERATURE: 98 F | DIASTOLIC BLOOD PRESSURE: 80 MMHG | RESPIRATION RATE: 18 BRPM | SYSTOLIC BLOOD PRESSURE: 130 MMHG | BODY MASS INDEX: 26.82 KG/M2

## 2023-12-05 DIAGNOSIS — R07.89 ATYPICAL CHEST PAIN: ICD-10-CM

## 2023-12-05 DIAGNOSIS — I10 BENIGN HYPERTENSION: ICD-10-CM

## 2023-12-05 DIAGNOSIS — Z96.642 STATUS POST LEFT HIP REPLACEMENT: ICD-10-CM

## 2023-12-05 DIAGNOSIS — E55.9 VITAMIN D DEFICIENCY: ICD-10-CM

## 2023-12-05 DIAGNOSIS — E78.5 DYSLIPIDEMIA: ICD-10-CM

## 2023-12-05 DIAGNOSIS — M51.9 LUMBOSACRAL DISC DISEASE: ICD-10-CM

## 2023-12-05 DIAGNOSIS — U07.1 COVID-19: ICD-10-CM

## 2023-12-05 DIAGNOSIS — I10 PRIMARY HYPERTENSION: ICD-10-CM

## 2023-12-05 DIAGNOSIS — Z00.00 ROUTINE GENERAL MEDICAL EXAMINATION AT HEALTH CARE FACILITY: Primary | ICD-10-CM

## 2023-12-05 LAB
25(OH)D3 SERPL-MCNC: 44 NG/ML (ref 30–100)
ALBUMIN SERPL BCP-MCNC: 4.3 G/DL (ref 3.4–5)
ALP SERPL-CCNC: 70 U/L (ref 33–136)
ALT SERPL W P-5'-P-CCNC: 95 U/L (ref 10–52)
ANION GAP SERPL CALC-SCNC: 15 MMOL/L (ref 10–20)
AST SERPL W P-5'-P-CCNC: 63 U/L (ref 9–39)
BILIRUB SERPL-MCNC: 0.7 MG/DL (ref 0–1.2)
BUN SERPL-MCNC: 15 MG/DL (ref 6–23)
CALCIUM SERPL-MCNC: 9.5 MG/DL (ref 8.6–10.6)
CHLORIDE SERPL-SCNC: 97 MMOL/L (ref 98–107)
CHOLEST SERPL-MCNC: 140 MG/DL (ref 0–199)
CHOLESTEROL/HDL RATIO: 3.1
CO2 SERPL-SCNC: 30 MMOL/L (ref 21–32)
CREAT SERPL-MCNC: 0.8 MG/DL (ref 0.5–1.3)
ERYTHROCYTE [DISTWIDTH] IN BLOOD BY AUTOMATED COUNT: 12.5 % (ref 11.5–14.5)
GFR SERPL CREATININE-BSD FRML MDRD: >90 ML/MIN/1.73M*2
GLUCOSE SERPL-MCNC: 82 MG/DL (ref 74–99)
HCT VFR BLD AUTO: 45.8 % (ref 41–52)
HDLC SERPL-MCNC: 45.2 MG/DL
HGB BLD-MCNC: 15.6 G/DL (ref 13.5–17.5)
LDLC SERPL CALC-MCNC: 79 MG/DL
MCH RBC QN AUTO: 30.5 PG (ref 26–34)
MCHC RBC AUTO-ENTMCNC: 34.1 G/DL (ref 32–36)
MCV RBC AUTO: 90 FL (ref 80–100)
NON HDL CHOLESTEROL: 95 MG/DL (ref 0–149)
NRBC BLD-RTO: 0 /100 WBCS (ref 0–0)
PLATELET # BLD AUTO: 218 X10*3/UL (ref 150–450)
POTASSIUM SERPL-SCNC: 4.3 MMOL/L (ref 3.5–5.3)
PROT SERPL-MCNC: 6.6 G/DL (ref 6.4–8.2)
RBC # BLD AUTO: 5.11 X10*6/UL (ref 4.5–5.9)
SODIUM SERPL-SCNC: 138 MMOL/L (ref 136–145)
TRIGL SERPL-MCNC: 77 MG/DL (ref 0–149)
TSH SERPL-ACNC: 0.86 MIU/L (ref 0.44–3.98)
VLDL: 15 MG/DL (ref 0–40)
WBC # BLD AUTO: 8.1 X10*3/UL (ref 4.4–11.3)

## 2023-12-05 PROCEDURE — 1126F AMNT PAIN NOTED NONE PRSNT: CPT | Performed by: FAMILY MEDICINE

## 2023-12-05 PROCEDURE — 3079F DIAST BP 80-89 MM HG: CPT | Performed by: FAMILY MEDICINE

## 2023-12-05 PROCEDURE — 80053 COMPREHEN METABOLIC PANEL: CPT

## 2023-12-05 PROCEDURE — G0439 PPPS, SUBSEQ VISIT: HCPCS | Performed by: FAMILY MEDICINE

## 2023-12-05 PROCEDURE — 82306 VITAMIN D 25 HYDROXY: CPT

## 2023-12-05 PROCEDURE — 85027 COMPLETE CBC AUTOMATED: CPT

## 2023-12-05 PROCEDURE — 1036F TOBACCO NON-USER: CPT | Performed by: FAMILY MEDICINE

## 2023-12-05 PROCEDURE — 36415 COLL VENOUS BLD VENIPUNCTURE: CPT

## 2023-12-05 PROCEDURE — 1160F RVW MEDS BY RX/DR IN RCRD: CPT | Performed by: FAMILY MEDICINE

## 2023-12-05 PROCEDURE — 99214 OFFICE O/P EST MOD 30 MIN: CPT | Performed by: FAMILY MEDICINE

## 2023-12-05 PROCEDURE — 84443 ASSAY THYROID STIM HORMONE: CPT

## 2023-12-05 PROCEDURE — 3075F SYST BP GE 130 - 139MM HG: CPT | Performed by: FAMILY MEDICINE

## 2023-12-05 PROCEDURE — 1159F MED LIST DOCD IN RCRD: CPT | Performed by: FAMILY MEDICINE

## 2023-12-05 PROCEDURE — 1170F FXNL STATUS ASSESSED: CPT | Performed by: FAMILY MEDICINE

## 2023-12-05 PROCEDURE — 80061 LIPID PANEL: CPT

## 2023-12-05 ASSESSMENT — PAIN SCALES - GENERAL: PAINLEVEL: 0-NO PAIN

## 2023-12-05 ASSESSMENT — ACTIVITIES OF DAILY LIVING (ADL)
TAKING_MEDICATION: INDEPENDENT
MANAGING_FINANCES: INDEPENDENT
DOING_HOUSEWORK: INDEPENDENT
GROCERY_SHOPPING: INDEPENDENT
DRESSING: INDEPENDENT
BATHING: INDEPENDENT

## 2023-12-05 ASSESSMENT — ENCOUNTER SYMPTOMS
LOSS OF SENSATION IN FEET: 0
DEPRESSION: 0
OCCASIONAL FEELINGS OF UNSTEADINESS: 0

## 2023-12-05 NOTE — PROGRESS NOTES
Subjective   Reason for Visit: Edilson Live is an 73 y.o. male here for a Medicare Wellness visit.     Past Medical, Surgical, and Family History reviewed and updated in chart.    Reviewed all medications by prescribing practitioner or clinical pharmacist (such as prescriptions, OTCs, herbal therapies and supplements) and documented in the medical record.    HPI  : Patient is recovering from Covid 2 weeks ago.  He is Here for Medicare wellness exam and also follow-up from his COVID infection.  He will answer the questions for the Medicare wellness exam as presented by the medical assistant.  He also does have a living will and medical power of .  Patient will have his blood work drawn today and also a physical exam.  He states he still has a cough from the COVID but he tested negative and is slowly improving.  He did have loss of appetite and fatigue but that continues to improve each day, he also states his wife now has it.    Patient Care Team:  Tanmay Rosas DO as PCP - General  Tanmay Rosas DO as PCP - [facility] Attributed Provider     Review of Systems  :  ROS :10 systems were reviewed and the information is included in the HPI and no additional review of systems is indicated.    Objective   Vitals:  /80   Pulse 74   Temp 36.7 °C (98 °F)   Resp 18   Ht 1.829 m (6')   Wt 89.8 kg (198 lb)   SpO2 94%   BMI 26.85 kg/m²       Physical Exam  Vitals and nursing note reviewed.   Constitutional:       Appearance: Normal appearance. He is normal weight.      Comments: Patient is alert and oriented x3.   No acute distress   HENT:      Head: Normocephalic.      Right Ear: Tympanic membrane and external ear normal.      Left Ear: Tympanic membrane and external ear normal.      Ears:      Comments: Ears are patent bilaterally and TMs are clear.     Nose: Nose normal.      Mouth/Throat:      Mouth: Mucous membranes are moist.      Pharynx: Oropharynx is clear.      Comments: Mouth is moist,  tongue is midline.  No posterior pharyngeal erythema.  Eyes:      Extraocular Movements: Extraocular movements intact.      Conjunctiva/sclera: Conjunctivae normal.      Pupils: Pupils are equal, round, and reactive to light.      Comments: Patient denies any visual disturbance and does have a yearly eye exam.   Neck:      Comments: No carotid bruits, no thyromegaly, no cervical adenopathy.  Occasional neck spasm and restriction of motion secondary to stress and tension.  Cardiovascular:      Rate and Rhythm: Normal rate and regular rhythm.      Pulses: Normal pulses.      Heart sounds: Normal heart sounds.      Comments: Patient denies chest pain and no palpitations.  Heart rhythm is stable S1 and S2 are noted, no ectopics.  Pulmonary:      Effort: Pulmonary effort is normal.      Comments: Dry cough from Covid.  Patient states he is improving every day and has no wheezing or shortness of breath.  Abdominal:      General: Bowel sounds are normal.      Palpations: Abdomen is soft.      Comments: Patient did have some abdominal discomfort after his COVID infection.  It is slowly improving and he will try some probiotics and also some Pepto-Bismol.  He denies current guarding or rebound tenderness.   Genitourinary:     Comments: Patient denies dysuria, no hematuria, no nocturia, denies flank pain.  Musculoskeletal:      Cervical back: Normal range of motion and neck supple.      Comments: Patient has lumbosacral disc problems and had surgery years ago.  He recently saw a different neurosurgeon and they are recommending conservative treatment at this time.  He does try to stretch and do exercises to help with his low back.  He is not interested in any surgery at this time.  He also had a previous hip replacement and is stable and he does stay active playing golf.  No ankle edema.   Skin:     General: Skin is warm.      Comments: There is no bruising, no erythema, no skin lesions noted, no rashes.   Neurological:       General: No focal deficit present.      Mental Status: He is alert and oriented to person, place, and time. Mental status is at baseline.      Sensory: Sensory deficit present.      Comments: No focal neurosensory deficits are noted.  Patient denies any peripheral neuropathy.  Coordination and gait are stable.  Normal muscle strength upper and lower extremities.  Occasionally gets some paresthesias in his lower extremities from his low back problems.   Psychiatric:         Mood and Affect: Mood normal.         Behavior: Behavior normal.         Thought Content: Thought content normal.         Judgment: Judgment normal.      Comments: Patient has normal mood and affect.  Patient did have some anxiety with recent COVID infection but it only lasted about a week and he is feeling better.  Thought content and judgment are stable.  No signs of vascular dementia.  Behavior is normal.     PLAN : Patient is a 73-year-old male who was evaluated today for his Medicare wellness exam.  He did complete the questionnaire as presented by the medical assistant.  He does have a living will and medical power of .  Patient did have his blood drawn today and will be notified of results and 3 days and further recommendations will be made at that time.  He is recovering from a recent COVID infection and seems to be getting better aside from some GI upset.  He will continue with conservative treatment and if any problems should arise he will contact me.  Patient will follow-up in 4 to 6 months or sooner as needed.    Assessment/Plan   Problem List Items Addressed This Visit       Vitamin D deficiency    Relevant Orders    Vitamin D 25-Hydroxy,Total (for eval of Vitamin D levels)    Dyslipidemia    Relevant Orders    CBC    Comprehensive Metabolic Panel    Lipid Panel    Thyroid Stimulating Hormone    Benign hypertension    Relevant Orders    CBC    Comprehensive Metabolic Panel    Lipid Panel    Thyroid Stimulating Hormone     Atypical chest pain    Relevant Orders    CBC    Comprehensive Metabolic Panel    Lipid Panel    Thyroid Stimulating Hormone     Other Visit Diagnoses       Routine general medical examination at health care facility    -  Primary

## 2023-12-07 NOTE — RESULT ENCOUNTER NOTE
Assumed care of patient at 0645. Bedside report received. Assessment complete.  AA&Ox4. Denies SOB but has some expiratory wheezing. Pt states it started because of the mass.     Reporting 9/10 pain. Medications used for pain control.  Educated patient regarding pharmacologic and non pharmacologic modalities for pain management.    Skin per flow sheets.    Pt NPO for OR. Denies N/V.  + void. Last BM 6/23/2022.    Pt ambulates x self to restroom.    Plan of care discussed, all questions answered. Educated on the importance of calling before getting OOB and pt verbalizes understanding. Educated regarding importance of oral care. Oral care kit at bedside. Call light is within reach, treaded slipper socks on, bed in lowest/ locked position, hourly rounding in place, all needs met at this time.    Madai Marti R.N.       Kidney function and blood sugar are normal           2 liver enzymes are up just slightly     and that could be from the COVID.        That should come back down to normal     cholesterol is normal at 140       triglycerides are normal at 77    vitamin D is normal at 44     thyroid function is normal      red and white blood cell counts are normal    blood work looks stable     we can recheck the liver enzymes in 3 months

## 2024-01-09 ENCOUNTER — OFFICE VISIT (OUTPATIENT)
Dept: NEUROSURGERY | Facility: CLINIC | Age: 74
End: 2024-01-09
Payer: MEDICARE

## 2024-01-09 VITALS
TEMPERATURE: 97.3 F | DIASTOLIC BLOOD PRESSURE: 80 MMHG | HEART RATE: 64 BPM | WEIGHT: 200 LBS | HEIGHT: 72 IN | SYSTOLIC BLOOD PRESSURE: 142 MMHG | BODY MASS INDEX: 27.09 KG/M2

## 2024-01-09 DIAGNOSIS — M48.061 DEGENERATIVE LUMBAR SPINAL STENOSIS: ICD-10-CM

## 2024-01-09 DIAGNOSIS — M43.10 DEGENERATIVE SPONDYLOLISTHESIS: ICD-10-CM

## 2024-01-09 DIAGNOSIS — M47.816 LUMBAR SPONDYLOSIS: Primary | ICD-10-CM

## 2024-01-09 PROCEDURE — 1159F MED LIST DOCD IN RCRD: CPT | Performed by: NEUROLOGICAL SURGERY

## 2024-01-09 PROCEDURE — 3079F DIAST BP 80-89 MM HG: CPT | Performed by: NEUROLOGICAL SURGERY

## 2024-01-09 PROCEDURE — 99213 OFFICE O/P EST LOW 20 MIN: CPT | Performed by: NEUROLOGICAL SURGERY

## 2024-01-09 PROCEDURE — 1125F AMNT PAIN NOTED PAIN PRSNT: CPT | Performed by: NEUROLOGICAL SURGERY

## 2024-01-09 PROCEDURE — 1036F TOBACCO NON-USER: CPT | Performed by: NEUROLOGICAL SURGERY

## 2024-01-09 PROCEDURE — 3077F SYST BP >= 140 MM HG: CPT | Performed by: NEUROLOGICAL SURGERY

## 2024-01-09 ASSESSMENT — PATIENT HEALTH QUESTIONNAIRE - PHQ9
SUM OF ALL RESPONSES TO PHQ9 QUESTIONS 1 & 2: 0
2. FEELING DOWN, DEPRESSED OR HOPELESS: NOT AT ALL
1. LITTLE INTEREST OR PLEASURE IN DOING THINGS: NOT AT ALL

## 2024-01-09 ASSESSMENT — PAIN SCALES - GENERAL: PAINLEVEL: 1

## 2024-01-09 NOTE — PROGRESS NOTES
[facility] [place]  Department of Neurological Surgery  Established Patient Visit    History of Present Illness  Edilson Live is a 73 y.o. year old male who presents to the spine clinic in follow up with lumbar stenosis.  He had originally presented with low back and right lower extremity pain.  His prior MRI had shown multilevel degenerative changes with severe stenosis at L3-4 and L4-5 as well as foraminal stenosis at L3-4, L4-5 and L5-S1.  That said, his pain mostly resolved following physical therapy and a single epidural steroid injection.  I last saw him in September 2023.  At this time, he states that he continues to be relatively pain-free.  He does have some expected low back stiffness.  He is still able to participate in golf and other physical activities that he enjoys.  He has some aching and tightness in his right anterior thigh, but states that this gets better after stretching.    Patient's BMI is Body mass index is 27.12 kg/m².    14/14 systems reviewed and negative other than what is listed in the history of present illness    Patient Active Problem List   Diagnosis    Abnormal screening cardiac CT    Acute bronchitis    Bronchitis    Acute maxillary sinusitis    Abnormal skin growth    Vitamin D deficiency    Throat irritation    Strain of groin    Rash of back    Groin rash    Status post left hip replacement    Skin lesion of right leg    Sinusitis    Right knee DJD    Right inguinal hernia    Insomnia    Hip pain, acute, left    Left-sided back pain    Liver lesion    Lumbar herniated disc    Lumbar neuritis    Lumbar postlaminectomy syndrome    Lumbosacral disc disease    Lumbosacral strain    Metatarsalgia of both feet    Osteoarthritis of knees, bilateral    Osteoarthritis, hip, bilateral    Pain of left side of body    Persistent cough    Primary osteoarthritis of left knee    Hyperlipidemia    Hand tendonitis    Infection of skin    Gout    Gastroesophageal reflux disease  with esophagitis    Flank strain, initial encounter    Fibroepithelial papilloma    Fatigue    Erectile dysfunction    Elevated blood pressure reading with diagnosis of hypertension    Easy bruising    Dyslipidemia    Dysfunction of both eustachian tubes    Degenerative joint disease (DJD) of hip    Coronary artery calcification    Chronic pain of left groin    Chronic low back pain    Cervical strain    Strain of left knee and leg    Quadriceps muscle strain    Cervical osteoarthritis    Bursitis of hip, right    Bruised rib, left, initial encounter    Benign hypertension    Chest pain, atypical    Atypical chest pain    Upper respiratory infection with cough and congestion    Acute URI    Acute lumbar radiculopathy    Foot pain, bilateral    Bilateral inguinal hernia (BIH)    Primary hypertension    Shoulder strain, left, initial encounter    Bruising    Routine general medical examination at health care facility    COVID-19     Past Medical History:   Diagnosis Date    Acute upper respiratory infection, unspecified 11/20/2017    Acute URI    Bicipital tendinitis, left shoulder     Bicipital tendinitis of left shoulder    Effusion, right knee     Effusion of knee joint right    Encounter for removal of sutures     Visit for suture removal    Other benign neoplasm of skin of scalp and neck     Papilloma of skin of neck    Other conditions influencing health status     Myalgia and myositis    Other conditions influencing health status     Neck sprain and strain    Other specified disorders of the skin and subcutaneous tissue     Dysplastic skin lesion    Overweight     Overweight (BMI 25.0-29.9)    Personal history of other (healed) physical injury and trauma     History of sprain of knee    Personal history of other diseases of the circulatory system     History of hypertension    Personal history of other diseases of the musculoskeletal system and connective tissue     History of low back pain    Personal history  of other diseases of the respiratory system     History of pneumothorax    Radiculopathy, cervical region     Brachial neuritis    Reaction to severe stress, unspecified     Stress    Sprain of unspecified parts of thorax, initial encounter     Sprain thoracic region    Strain of muscle, fascia and tendon at neck level, initial encounter 07/10/2018    Strain of neck muscle, initial encounter     Past Surgical History:   Procedure Laterality Date    KNEE ARTHROSCOPY W/ DEBRIDEMENT  07/12/2016    Arthroscopy Knee    OTHER SURGICAL HISTORY  01/07/2020    Colonoscopy    OTHER SURGICAL HISTORY  01/07/2020    Hip replacement    OTHER SURGICAL HISTORY  01/07/2020    Back surgery     Social History     Tobacco Use    Smoking status: Never     Passive exposure: Never    Smokeless tobacco: Never   Substance Use Topics    Alcohol use: Not Currently     Alcohol/week: 5.0 standard drinks of alcohol     Types: 1 Glasses of wine, 1 Cans of beer, 3 Shots of liquor per week     family history includes Arthritis in an other family member; Diabetes in an other family member; Hypertension in an other family member; Lung disease in an other family member; Stroke in an other family member; cardiac disorder in an other family member.    Current Outpatient Medications:     aspirin 81 mg EC tablet, Take 1 tablet (81 mg) by mouth once daily., Disp: , Rfl:     cholecalciferol (Vitamin D-3) 25 MCG (1000 UT) capsule, Take 1 capsule (25 mcg) by mouth once daily., Disp: , Rfl:     ciclopirox (Penlac) 8 % solution, apply every night at bedtime, Disp: , Rfl:     lisinopril 20 mg tablet, Take 1 tablet (20 mg) by mouth once daily., Disp: , Rfl:     pantoprazole (ProtoNix) 40 mg EC tablet, TAKE 1 TABLET BY MOUTH EVERY DAY, Disp: 90 tablet, Rfl: 1    simvastatin (Zocor) 40 mg tablet, Take 1 tablet (40 mg) by mouth once daily., Disp: , Rfl:     clotrimazole-betamethasone (Lotrisone) cream, twice a day., Disp: , Rfl:   Allergies   Allergen Reactions     Chlorhexidin-Isopropyl Alcohol Unknown       Physical Examination:    General: Well developed, awake/alert/oriented x3, no distress, alert and cooperative  Skin: Warm and dry, no lesions, no rashes  ENMT: Mucous membranes moist, no apparent injury, no lesions seen  Head/Neck: Neck Supple, no apparent injury  Respiratory/Thorax: Normal breath sounds with good chest expansion, thorax symmetric  Cardiovascular: No pitting edema, no JVD    Motor Strength: 5/5 Throughout all extremities    Muscle Bulk: Normal and symmetric in all extremities    Posture:   -- Cervical: Normal  -- Thoracic: Normal  -- Lumbar : Normal  Paraspinal muscle spasm/tenderness absent.     Sensation: intact to light touch      Results:  I personally reviewed and interpreted the imaging results which included x-rays of the lumbar spine performed on September 12, 2023.  These show a mild lumbar levoscoliosis.  There is grade 1 anterolisthesis at L4-5 and L5-S1 that is partially corrected with extension and mostly resolves with lying supine (see MRI).    Assessment and Plan:    Edilson Live is a 73 y.o. year old male who presents to the spine clinic in follow up with lumbar stenosis.  He had originally presented with low back and right lower extremity pain.  His prior MRI had shown multilevel degenerative changes with severe stenosis at L3-4 and L4-5 as well as foraminal stenosis at L3-4, L4-5 and L5-S1.  That said, his pain mostly resolved following physical therapy and a single epidural steroid injection.  I last saw him in September 2023.  At this time, he states that he continues to be relatively pain-free.  He does have some expected low back stiffness.  He is still able to participate in golf and other physical activities that he enjoys.  He has some aching and tightness in his right anterior thigh, but states that this gets better after stretching.  His new x-rays show dynamic anterolisthesis at L4-5 and L5-S1.  I had a lengthy discussion with  the patient regarding his condition and treatment options.  He is essentially asymptomatic at this time, and thus, I do not see a indication for surgery or other treatment at this time.  I did encourage him to continue with a home exercise program.  I will plan to see him back in clinic in 6 months to assess his progress.  Should he have new or worsening symptoms, I told him that he could be a candidate for a L3-S1 decompression and fusion.      I have reviewed all prior documentation and reviewed the electronic medical record since admission. I have personally have reviewed all advanced imaging not just the reports and used my interpretation as documented as the relevant findings. I have reviewed the risks and benefits of all treatment recommendations listed in this note with the patient and family. I spent a total of 25 minutes in service to this patient's care during this date of service.      The above clinical summary has been dictated with voice recognition software. It has not been proofread for grammatical errors, typographical mistakes, or other semantic inconsistencies.    Thank you for visiting our office today. It was our pleasure to take part in your healthcare.     Do not hesitate to call with any questions regarding your plan of care after leaving at [phone] M-F 8am-4pm.     To clinicians, thank you very much for this kind referral. It is a privilege to partner with you in the care of your patients. My office would be delighted to assist you with any further consultations or with questions regarding the plan of care outlined. Do not hesitate to call the office or contact me directly.       Sincerely,      Theron Spicer MD PhD  Attending Neurosurgeon  [facility]   of Neurological Surgery  [facility]    [address] [place] [address]  [id number]    [facility]  [address] [facility]  [address] [place] [id number]    Office: [phone]  Fax: [phone]

## 2024-01-22 DIAGNOSIS — R21 RASH AND OTHER NONSPECIFIC SKIN ERUPTION: ICD-10-CM

## 2024-01-22 DIAGNOSIS — K21.00 GASTRO-ESOPHAGEAL REFLUX DISEASE WITH ESOPHAGITIS, WITHOUT BLEEDING: ICD-10-CM

## 2024-01-22 RX ORDER — PANTOPRAZOLE SODIUM 40 MG/1
TABLET, DELAYED RELEASE ORAL
Qty: 90 TABLET | Refills: 1 | Status: SHIPPED | OUTPATIENT
Start: 2024-01-22

## 2024-01-22 RX ORDER — CLOTRIMAZOLE AND BETAMETHASONE DIPROPIONATE 10; .64 MG/G; MG/G
CREAM TOPICAL
Qty: 15 G | Refills: 3 | Status: SHIPPED | OUTPATIENT
Start: 2024-01-22

## 2024-03-14 ENCOUNTER — OFFICE VISIT (OUTPATIENT)
Dept: PRIMARY CARE | Facility: CLINIC | Age: 74
End: 2024-03-14
Payer: MEDICARE

## 2024-03-14 VITALS
SYSTOLIC BLOOD PRESSURE: 130 MMHG | DIASTOLIC BLOOD PRESSURE: 80 MMHG | RESPIRATION RATE: 16 BRPM | OXYGEN SATURATION: 94 % | HEIGHT: 72 IN | HEART RATE: 72 BPM | WEIGHT: 197 LBS | BODY MASS INDEX: 26.68 KG/M2 | TEMPERATURE: 97.9 F

## 2024-03-14 DIAGNOSIS — I10 PRIMARY HYPERTENSION: ICD-10-CM

## 2024-03-14 DIAGNOSIS — R53.83 OTHER FATIGUE: ICD-10-CM

## 2024-03-14 DIAGNOSIS — K21.00 GASTROESOPHAGEAL REFLUX DISEASE WITH ESOPHAGITIS WITHOUT HEMORRHAGE: ICD-10-CM

## 2024-03-14 DIAGNOSIS — M51.9 LUMBOSACRAL DISC DISEASE: ICD-10-CM

## 2024-03-14 DIAGNOSIS — R74.8 ELEVATED LIVER ENZYMES: Primary | ICD-10-CM

## 2024-03-14 DIAGNOSIS — M19.90 ARTHRITIS: ICD-10-CM

## 2024-03-14 LAB
ALBUMIN SERPL BCP-MCNC: 4.4 G/DL (ref 3.4–5)
ALP SERPL-CCNC: 38 U/L (ref 33–136)
ALT SERPL W P-5'-P-CCNC: 14 U/L (ref 10–52)
ANION GAP SERPL CALC-SCNC: 13 MMOL/L (ref 10–20)
AST SERPL W P-5'-P-CCNC: 17 U/L (ref 9–39)
BILIRUB SERPL-MCNC: 0.9 MG/DL (ref 0–1.2)
BUN SERPL-MCNC: 17 MG/DL (ref 6–23)
CALCIUM SERPL-MCNC: 9.6 MG/DL (ref 8.6–10.6)
CHLORIDE SERPL-SCNC: 99 MMOL/L (ref 98–107)
CO2 SERPL-SCNC: 31 MMOL/L (ref 21–32)
CREAT SERPL-MCNC: 0.8 MG/DL (ref 0.5–1.3)
EGFRCR SERPLBLD CKD-EPI 2021: >90 ML/MIN/1.73M*2
ERYTHROCYTE [DISTWIDTH] IN BLOOD BY AUTOMATED COUNT: 12.9 % (ref 11.5–14.5)
GLUCOSE SERPL-MCNC: 87 MG/DL (ref 74–99)
HCT VFR BLD AUTO: 47 % (ref 41–52)
HGB BLD-MCNC: 16.1 G/DL (ref 13.5–17.5)
MCH RBC QN AUTO: 30.7 PG (ref 26–34)
MCHC RBC AUTO-ENTMCNC: 34.3 G/DL (ref 32–36)
MCV RBC AUTO: 90 FL (ref 80–100)
NRBC BLD-RTO: 0 /100 WBCS (ref 0–0)
PLATELET # BLD AUTO: 169 X10*3/UL (ref 150–450)
POTASSIUM SERPL-SCNC: 4.4 MMOL/L (ref 3.5–5.3)
PROT SERPL-MCNC: 6.5 G/DL (ref 6.4–8.2)
RBC # BLD AUTO: 5.24 X10*6/UL (ref 4.5–5.9)
SODIUM SERPL-SCNC: 139 MMOL/L (ref 136–145)
WBC # BLD AUTO: 4 X10*3/UL (ref 4.4–11.3)

## 2024-03-14 PROCEDURE — 1159F MED LIST DOCD IN RCRD: CPT | Performed by: FAMILY MEDICINE

## 2024-03-14 PROCEDURE — 36415 COLL VENOUS BLD VENIPUNCTURE: CPT

## 2024-03-14 PROCEDURE — 99214 OFFICE O/P EST MOD 30 MIN: CPT | Performed by: FAMILY MEDICINE

## 2024-03-14 PROCEDURE — 80053 COMPREHEN METABOLIC PANEL: CPT

## 2024-03-14 PROCEDURE — 3079F DIAST BP 80-89 MM HG: CPT | Performed by: FAMILY MEDICINE

## 2024-03-14 PROCEDURE — 1160F RVW MEDS BY RX/DR IN RCRD: CPT | Performed by: FAMILY MEDICINE

## 2024-03-14 PROCEDURE — 1036F TOBACCO NON-USER: CPT | Performed by: FAMILY MEDICINE

## 2024-03-14 PROCEDURE — 3075F SYST BP GE 130 - 139MM HG: CPT | Performed by: FAMILY MEDICINE

## 2024-03-14 PROCEDURE — 1126F AMNT PAIN NOTED NONE PRSNT: CPT | Performed by: FAMILY MEDICINE

## 2024-03-14 PROCEDURE — 85027 COMPLETE CBC AUTOMATED: CPT

## 2024-03-14 RX ORDER — CELECOXIB 200 MG/1
200 CAPSULE ORAL DAILY
Qty: 30 CAPSULE | Refills: 2 | Status: SHIPPED | OUTPATIENT
Start: 2024-03-14 | End: 2024-06-12

## 2024-03-14 ASSESSMENT — PATIENT HEALTH QUESTIONNAIRE - PHQ9
SUM OF ALL RESPONSES TO PHQ9 QUESTIONS 1 AND 2: 0
1. LITTLE INTEREST OR PLEASURE IN DOING THINGS: NOT AT ALL
2. FEELING DOWN, DEPRESSED OR HOPELESS: NOT AT ALL

## 2024-03-14 ASSESSMENT — PAIN SCALES - GENERAL: PAINLEVEL: 0-NO PAIN

## 2024-03-14 NOTE — PROGRESS NOTES
Subjective   Edilson Live is a 73 y.o. male who presents for Follow-up (Follow up visit, blood draw today to follow up with previous abnormal results).    HPI  : Patient is a 73-year-old male who is in for recheck on some blood work since he had some elevated liver enzymes on his previous blood tests.  He does have hypertension and lumbosacral disc disease, he also would like something for arthritic aches and pains.  Patient tries to stay active exercising and playing golf but has some arthritic aches and pains in his low back and his knees.  He also has Dupuytren's contracture of his left hand and will probably need surgery in the fall , since it is quite significant.      Objective  : ROS : 10 systems were reviewed and the information is included in the HPI and no additional review of systems is indicated.    Physical Exam  Vitals and nursing note reviewed.   Constitutional:       Appearance: Normal appearance.      Comments: Patient is alert and oriented x3.   No acute distress   HENT:      Head: Normocephalic.      Right Ear: Tympanic membrane and external ear normal.      Left Ear: Tympanic membrane and external ear normal.      Ears:      Comments: Ears are patent bilaterally and TMs are clear.     Nose: Nose normal.      Mouth/Throat:      Mouth: Mucous membranes are moist.      Pharynx: Oropharynx is clear.      Comments: Mouth is moist, tongue is midline.  No posterior pharyngeal erythema.  Eyes:      Extraocular Movements: Extraocular movements intact.      Conjunctiva/sclera: Conjunctivae normal.      Pupils: Pupils are equal, round, and reactive to light.      Comments: No visual disturbance, does have his  eyes examined once a year.   Neck:      Comments: No carotid bruits, no thyromegaly, no cervical adenopathy.  Occasional neck spasm and restriction of motion secondary to stress and tension.  Cardiovascular:      Rate and Rhythm: Normal rate and regular rhythm.      Pulses: Normal pulses.      Heart  sounds: Normal heart sounds.      Comments: Patient denies chest pain and no palpitations.  Heart rhythm is stable S1 and S2 are noted, no ectopics.  Pulmonary:      Effort: Pulmonary effort is normal.      Comments: Patient denies any coughing or wheezing.  Lungs are clear to auscultation.    Abdominal:      General: Bowel sounds are normal.      Palpations: Abdomen is soft.      Comments: Abdomen is soft and non tender.   No flank tenderness.  No suprapubic pain.  Positive bowel sounds x4.  No abdominal guarding and no rebound tenderness.   Genitourinary:     Comments: Patient denies dysuria, no hematuria, no nocturia, denies flank pain.  Musculoskeletal:         General: Tenderness and deformity present. Normal range of motion.      Cervical back: Normal range of motion.      Comments: Dupuytren's contracture left hand.  DJD of the knees, age-related and primary osteoarthritis.   Previous lumbar spine surgery with lumbosacral disc disease.   Recently had consultation with neurosurgery and is planning on seeing pain management again.   Skin:     General: Skin is warm.      Comments: Patient saw dermatology and had some precancerous skin lesions removed from his forehead.  Overall looks very stable.   Neurological:      General: No focal deficit present.      Mental Status: He is alert and oriented to person, place, and time. Mental status is at baseline.      Sensory: Sensory deficit present.      Comments: Occasional paresthesias in the lower extremity from lumbosacral disc disease and previous lumbar surgery.  No focal neurosensory deficits are noted.  Patient denies any peripheral neuropathy.  Coordination and gait are stable.  Normal muscle strength upper and lower extremities.   Psychiatric:         Mood and Affect: Mood normal.         Behavior: Behavior normal.         Thought Content: Thought content normal.         Judgment: Judgment normal.      Comments: Patient has normal mood and affect.  Thought  content and judgment are stable.  No signs of vascular dementia.  Behavior is normal.     PLAN : Patient is a 73-year-old male who is relatively healthy but had some elevated liver enzymes on his last blood test.  That will be rechecked today and he will be notified of the results when available.  He also has some arthritis and lower back spasm.  He also has primary osteoarthritis of his knees.  He will be started on Celebrex 200 mg daily and see if this gives him some benefit.  Patient also recently had some skin lesions removed from his forehead at dermatology.  He otherwise is stable and will follow-up in 4 to 6 months.    Problem List Items Addressed This Visit       Elevated liver enzymes - Primary            Tanmya Rosas DO

## 2024-03-17 NOTE — RESULT ENCOUNTER NOTE
The white blood cell count   was borderline   low at 4000   and should be above 4400     I would just monitor that for now    and we can recheck it next time he comes in.     Red blood cell count and platelet count   are normal   blood sugar, kidney and liver function are normal    the liver enzymes that were elevated    are now back to normal       He  can  return   in 4 months    to  recheck   the  white  blood  cell count  otherwise  blood  work  is  stable.

## 2024-04-01 DIAGNOSIS — I10 ESSENTIAL (PRIMARY) HYPERTENSION: ICD-10-CM

## 2024-04-01 DIAGNOSIS — E78.5 HYPERLIPIDEMIA, UNSPECIFIED: ICD-10-CM

## 2024-04-01 RX ORDER — LISINOPRIL 20 MG/1
20 TABLET ORAL DAILY
Qty: 90 TABLET | Refills: 1 | Status: SHIPPED | OUTPATIENT
Start: 2024-04-01

## 2024-04-01 RX ORDER — SIMVASTATIN 40 MG/1
40 TABLET, FILM COATED ORAL DAILY
Qty: 90 TABLET | Refills: 1 | Status: SHIPPED | OUTPATIENT
Start: 2024-04-01

## 2024-04-12 ENCOUNTER — TELEPHONE (OUTPATIENT)
Dept: PAIN MEDICINE | Facility: CLINIC | Age: 74
End: 2024-04-12
Payer: MEDICARE

## 2024-04-12 DIAGNOSIS — M54.16 LUMBAR NEURITIS: Primary | ICD-10-CM

## 2024-04-12 NOTE — TELEPHONE ENCOUNTER
Patient called in requesting to have another Caudal injection with you.  His last office visit was 10/2023.  I verified with him that he has not been put on any blood thinners since then and there has been no insurance change.

## 2024-04-16 NOTE — TELEPHONE ENCOUNTER
Called and spoke to patient to get his appointment scheduled properly since it was originally done by [facility] incorrectly.  Went over pre-procedure instructions and gave stop date for aspirin.

## 2024-04-19 ENCOUNTER — TELEPHONE (OUTPATIENT)
Dept: PRIMARY CARE | Facility: CLINIC | Age: 74
End: 2024-04-19
Payer: MEDICARE

## 2024-05-07 ENCOUNTER — APPOINTMENT (OUTPATIENT)
Dept: PAIN MEDICINE | Facility: CLINIC | Age: 74
End: 2024-05-07
Payer: MEDICARE

## 2024-05-14 ENCOUNTER — HOSPITAL ENCOUNTER (OUTPATIENT)
Dept: RADIOLOGY | Facility: CLINIC | Age: 74
Discharge: HOME | End: 2024-05-14
Payer: MEDICARE

## 2024-05-14 ENCOUNTER — HOSPITAL ENCOUNTER (OUTPATIENT)
Dept: PAIN MEDICINE | Facility: CLINIC | Age: 74
Discharge: HOME | End: 2024-05-14
Payer: MEDICARE

## 2024-05-14 VITALS
RESPIRATION RATE: 15 BRPM | DIASTOLIC BLOOD PRESSURE: 70 MMHG | TEMPERATURE: 97.3 F | OXYGEN SATURATION: 97 % | HEART RATE: 67 BPM | SYSTOLIC BLOOD PRESSURE: 146 MMHG

## 2024-05-14 DIAGNOSIS — M54.16 LUMBAR NEURITIS: ICD-10-CM

## 2024-05-14 PROCEDURE — 2500000004 HC RX 250 GENERAL PHARMACY W/ HCPCS (ALT 636 FOR OP/ED)

## 2024-05-14 PROCEDURE — 2500000005 HC RX 250 GENERAL PHARMACY W/O HCPCS

## 2024-05-14 PROCEDURE — 62323 NJX INTERLAMINAR LMBR/SAC: CPT | Performed by: ANESTHESIOLOGY

## 2024-05-14 PROCEDURE — A4216 STERILE WATER/SALINE, 10 ML: HCPCS

## 2024-05-14 PROCEDURE — 7100000010 HC PHASE TWO TIME - EACH INCREMENTAL 1 MINUTE

## 2024-05-14 PROCEDURE — 7100000009 HC PHASE TWO TIME - INITIAL BASE CHARGE

## 2024-05-14 RX ORDER — LIDOCAINE HYDROCHLORIDE 5 MG/ML
INJECTION, SOLUTION INFILTRATION; INTRAVENOUS
Status: COMPLETED
Start: 2024-05-14 | End: 2024-05-14

## 2024-05-14 RX ORDER — SODIUM CHLORIDE 9 MG/ML
INJECTION, SOLUTION INTRAMUSCULAR; INTRAVENOUS; SUBCUTANEOUS
Status: COMPLETED
Start: 2024-05-14 | End: 2024-05-14

## 2024-05-14 RX ORDER — TRIAMCINOLONE ACETONIDE 40 MG/ML
INJECTION, SUSPENSION INTRA-ARTICULAR; INTRAMUSCULAR
Status: COMPLETED
Start: 2024-05-14 | End: 2024-05-14

## 2024-05-14 RX ADMIN — LIDOCAINE HYDROCHLORIDE 250 MG: 5 INJECTION, SOLUTION INFILTRATION at 08:30

## 2024-05-14 RX ADMIN — SODIUM CHLORIDE 10 ML: 9 INJECTION, SOLUTION INTRAMUSCULAR; INTRAVENOUS; SUBCUTANEOUS at 08:30

## 2024-05-14 RX ADMIN — TRIAMCINOLONE ACETONIDE 40 MG: 40 INJECTION, SUSPENSION INTRA-ARTICULAR; INTRAMUSCULAR at 08:30

## 2024-05-14 SDOH — ECONOMIC STABILITY: FOOD INSECURITY: WITHIN THE PAST 12 MONTHS, THE FOOD YOU BOUGHT JUST DIDN'T LAST AND YOU DIDN'T HAVE MONEY TO GET MORE.: NEVER TRUE

## 2024-05-14 SDOH — ECONOMIC STABILITY: FOOD INSECURITY: WITHIN THE PAST 12 MONTHS, YOU WORRIED THAT YOUR FOOD WOULD RUN OUT BEFORE YOU GOT MONEY TO BUY MORE.: NEVER TRUE

## 2024-05-14 ASSESSMENT — ENCOUNTER SYMPTOMS
CHILLS: 0
WEAKNESS: 0
CHEST TIGHTNESS: 0
DEPRESSION: 0
VOMITING: 0
BACK PAIN: 1
NAUSEA: 0
NECK PAIN: 0
DIAPHORESIS: 0
COUGH: 0
ARTHRALGIAS: 1
DIARRHEA: 0
SPEECH DIFFICULTY: 0
NUMBNESS: 1
DIZZINESS: 0
SEIZURES: 0
LOSS OF SENSATION IN FEET: 0
EYE DISCHARGE: 0
BLOOD IN STOOL: 0
FEVER: 0
NECK STIFFNESS: 0
CONSTIPATION: 0
WHEEZING: 0
SHORTNESS OF BREATH: 0
DIFFICULTY URINATING: 0
OCCASIONAL FEELINGS OF UNSTEADINESS: 0

## 2024-05-14 ASSESSMENT — PAIN - FUNCTIONAL ASSESSMENT
PAIN_FUNCTIONAL_ASSESSMENT: 0-10
PAIN_FUNCTIONAL_ASSESSMENT: 0-10

## 2024-05-14 ASSESSMENT — PAIN SCALES - GENERAL
PAINLEVEL_OUTOF10: 2
PAINLEVEL_OUTOF10: 0 - NO PAIN

## 2024-05-14 ASSESSMENT — PAIN DESCRIPTION - DESCRIPTORS: DESCRIPTORS: ACHING;SHARP

## 2024-05-14 NOTE — OP NOTE
5/14/2024    Pre procedure Diagnosis: Lumbar neuritis  Post procedure Diagnosis: Lumbar neuritis    Procedure:     1. CAUDAL epidural steroid injection   2. Fluoroscopic guidance     Complications: None    Assistants: None     EBL: None    The patient was identified in the preoperative area. After risks and benefits were explained, informed consent was obtained. The patient was brought back to the operating room and placed in the prone position. Standard ASA monitors were applied and monitored throughout the procedure. The vital signs were stable throughout. The patient's sacral spine area was prepped and draped in usual sterile fashion. Using fluoroscopic guidance the skin overlying the trajectory to the caudal canal was anesthetized with a total of 5 ml of 0.5% Lidocaine. Thereafter, a 3.5 in long 22G spinal needle was advanced through the anesthetized skin. Then, the needle was advanced into the Sacral Hiatus under multiplanar fluoroscopic guidance.  After negative aspiration for heme, or CSF a total of 10mL of PF Normal Saline and 40 mg of KENALOG was injected. The needle was removed and a sterile dressing was applied. The patient tolerated the procedure well and was transported to PACU in good condition.    PLAN: The pt will follow up in the office in one to two months to report their results with the procedure. Discharge instructions were reviewed in recovery and provided to the patient in writing. They were advised to call should they have any questions or concerns.

## 2024-05-14 NOTE — H&P
History Of Present Illness  Edilson Live is a 73 y.o. male presenting with lumbar neuritis.     Past Medical History  Past Medical History:   Diagnosis Date    Acute upper respiratory infection, unspecified 11/20/2017    Acute URI    Bicipital tendinitis, left shoulder     Bicipital tendinitis of left shoulder    Effusion, right knee     Effusion of knee joint right    Encounter for removal of sutures     Visit for suture removal    Other benign neoplasm of skin of scalp and neck     Papilloma of skin of neck    Other conditions influencing health status     Myalgia and myositis    Other conditions influencing health status     Neck sprain and strain    Other specified disorders of the skin and subcutaneous tissue     Dysplastic skin lesion    Overweight     Overweight (BMI 25.0-29.9)    Personal history of other (healed) physical injury and trauma     History of sprain of knee    Personal history of other diseases of the circulatory system     History of hypertension    Personal history of other diseases of the musculoskeletal system and connective tissue     History of low back pain    Personal history of other diseases of the respiratory system     History of pneumothorax    Radiculopathy, cervical region     Brachial neuritis    Reaction to severe stress, unspecified     Stress    Sprain of unspecified parts of thorax, initial encounter     Sprain thoracic region    Strain of muscle, fascia and tendon at neck level, initial encounter 07/10/2018    Strain of neck muscle, initial encounter       Surgical History  Past Surgical History:   Procedure Laterality Date    KNEE ARTHROSCOPY W/ DEBRIDEMENT  07/12/2016    Arthroscopy Knee    OTHER SURGICAL HISTORY  01/07/2020    Colonoscopy    OTHER SURGICAL HISTORY  01/07/2020    Hip replacement    OTHER SURGICAL HISTORY  01/07/2020    Back surgery        Social History  He reports that he has never smoked. He has never been exposed to tobacco smoke. He has never used  smokeless tobacco. He reports that he does not currently use alcohol after a past usage of about 5.0 standard drinks of alcohol per week. He reports that he does not use drugs.    Family History  Family History   Problem Relation Name Age of Onset    Arthritis Other multiple family members     Diabetes Other multiple family members     Hypertension Other multiple family members     Stroke Other multiple family members     Other (cardiac disorder) Other multiple family members     Lung disease Other multiple family members         Allergies  Chlorhexidin-isopropyl alcohol    Review of Systems   Constitutional:  Negative for chills, diaphoresis and fever.   HENT:  Negative for ear discharge and tinnitus.    Eyes:  Negative for discharge.   Respiratory:  Negative for cough, chest tightness, shortness of breath and wheezing.    Cardiovascular:  Negative for chest pain.   Gastrointestinal:  Negative for blood in stool, constipation, diarrhea, nausea and vomiting.   Endocrine: Negative for polyuria.   Genitourinary:  Negative for difficulty urinating.   Musculoskeletal:  Positive for arthralgias and back pain. Negative for gait problem, neck pain and neck stiffness.   Skin:  Negative for rash.   Neurological:  Positive for numbness. Negative for dizziness, seizures, speech difficulty and weakness.        Physical Exam  Constitutional:       Appearance: Normal appearance.   HENT:      Head: Normocephalic.      Nose: Nose normal.      Mouth/Throat:      Mouth: Mucous membranes are moist.      Pharynx: Oropharynx is clear.   Eyes:      Extraocular Movements: Extraocular movements intact.      Conjunctiva/sclera: Conjunctivae normal.      Pupils: Pupils are equal, round, and reactive to light.   Cardiovascular:      Rate and Rhythm: Normal rate.   Pulmonary:      Effort: Pulmonary effort is normal. No respiratory distress.      Breath sounds: No wheezing.   Chest:      Chest wall: No tenderness.   Abdominal:      Palpations:  Abdomen is soft.   Musculoskeletal:      Cervical back: No rigidity.   Skin:     General: Skin is warm and dry.      Findings: No rash.   Neurological:      Mental Status: He is alert and oriented to person, place, and time.      Sensory: Sensory deficit present.      Motor: Weakness present.      Gait: Gait abnormal.   Psychiatric:         Mood and Affect: Mood normal.         Behavior: Behavior normal.          Last Recorded Vitals  Blood pressure 146/70, pulse 61, temperature 36.3 °C (97.3 °F), temperature source Tympanic, resp. rate 18, SpO2 97%.       Assessment/Plan   1. Lumbar neuritis  Epidural Steroid Injection    Epidural Steroid Injection           Vicente Duke MD

## 2024-05-22 ENCOUNTER — APPOINTMENT (OUTPATIENT)
Dept: PRIMARY CARE | Facility: CLINIC | Age: 74
End: 2024-05-22
Payer: MEDICARE

## 2024-07-09 ENCOUNTER — APPOINTMENT (OUTPATIENT)
Dept: NEUROSURGERY | Facility: CLINIC | Age: 74
End: 2024-07-09
Payer: MEDICARE

## 2024-07-09 VITALS
HEART RATE: 72 BPM | HEIGHT: 72 IN | BODY MASS INDEX: 27.09 KG/M2 | TEMPERATURE: 97.1 F | SYSTOLIC BLOOD PRESSURE: 120 MMHG | DIASTOLIC BLOOD PRESSURE: 82 MMHG | WEIGHT: 200 LBS

## 2024-07-09 DIAGNOSIS — M43.10 DEGENERATIVE SPONDYLOLISTHESIS: Primary | ICD-10-CM

## 2024-07-09 DIAGNOSIS — M48.061 DEGENERATIVE LUMBAR SPINAL STENOSIS: ICD-10-CM

## 2024-07-09 PROBLEM — E78.5 DYSLIPIDEMIA: Status: RESOLVED | Noted: 2023-04-03 | Resolved: 2024-07-09

## 2024-07-09 PROBLEM — S19.9XXA INJURY OF NECK: Status: ACTIVE | Noted: 2024-07-09

## 2024-07-09 PROBLEM — E66.3 OVERWEIGHT WITH BODY MASS INDEX (BMI) 25.0-29.9: Status: ACTIVE | Noted: 2024-07-09

## 2024-07-09 PROCEDURE — 1125F AMNT PAIN NOTED PAIN PRSNT: CPT | Performed by: NEUROLOGICAL SURGERY

## 2024-07-09 PROCEDURE — 1159F MED LIST DOCD IN RCRD: CPT | Performed by: NEUROLOGICAL SURGERY

## 2024-07-09 PROCEDURE — 3079F DIAST BP 80-89 MM HG: CPT | Performed by: NEUROLOGICAL SURGERY

## 2024-07-09 PROCEDURE — 1036F TOBACCO NON-USER: CPT | Performed by: NEUROLOGICAL SURGERY

## 2024-07-09 PROCEDURE — 99213 OFFICE O/P EST LOW 20 MIN: CPT | Performed by: NEUROLOGICAL SURGERY

## 2024-07-09 PROCEDURE — 3074F SYST BP LT 130 MM HG: CPT | Performed by: NEUROLOGICAL SURGERY

## 2024-07-09 ASSESSMENT — PATIENT HEALTH QUESTIONNAIRE - PHQ9
1. LITTLE INTEREST OR PLEASURE IN DOING THINGS: NOT AT ALL
2. FEELING DOWN, DEPRESSED OR HOPELESS: NOT AT ALL
SUM OF ALL RESPONSES TO PHQ9 QUESTIONS 1 & 2: 0

## 2024-07-09 ASSESSMENT — PAIN SCALES - GENERAL: PAINLEVEL: 1

## 2024-07-09 NOTE — PROGRESS NOTES
[facility] [place]  Department of Neurological Surgery  Established Patient Visit    History of Present Illness:  Edilson Live is a 73 y.o. year old male who presents to the spine clinic in follow up with lumbar stenosis.  He originally presented 2 years ago with low back pain and right lower extremity pain since then he underwent to epidural injections most recent in May of this year.  He also underwent PT treatments.  His prior MRI had shown multilevel degenerative changes with severe stenosis at L3-4 and L4-5 as well as foraminal stenosis at L3-4, L4-5 and L5-S1. Additionally he was found to have L4-L5 and L5-S1 spondylolisthesis which he was asymptomatic from.  He is a golfer and stays very active.  Today he is presenting to us for routine follow-up.  He denies any new symptoms.  He has some stiffness in the morning when he wakes up which gets better with some stretches.  He denies any new back pain or radicular leg pain.  He reports satisfactory results from his injections and is not experiencing any other significant discomfort.  He denies any bowel or bladder dysfunction.  He denies any neck pain or arm symptoms.    Patient's BMI is Body mass index is 27.12 kg/m².    Review of Systems:  10 point ROS is obtained and negative except the ones mentioned in the HPI    Patient Active Problem List   Diagnosis    Abnormal screening cardiac CT    Acute bronchitis    Bronchitis    Acute maxillary sinusitis    Abnormal skin growth    Vitamin D deficiency    Throat irritation    Strain of groin    Rash of back    Groin rash    Status post left hip replacement    Skin lesion of right leg    Sinusitis    Right knee DJD    Right inguinal hernia    Insomnia    Hip pain, acute, left    Left-sided back pain    Liver lesion    Lumbar herniated disc    Lumbar neuritis    Lumbar postlaminectomy syndrome    Lumbosacral disc disease    Lumbosacral strain    Metatarsalgia of both feet    Osteoarthritis of knees,  bilateral    Osteoarthritis, hip, bilateral    Pain of left side of body    Persistent cough    Primary osteoarthritis of left knee    Hyperlipidemia    Hand tendonitis    Infection of skin    Gout    Gastroesophageal reflux disease with esophagitis    Flank strain, initial encounter    Fibroepithelial papilloma    Fatigue    Erectile dysfunction    Elevated blood pressure reading with diagnosis of hypertension    Easy bruising    Dyslipidemia    Dysfunction of both eustachian tubes    Degenerative joint disease (DJD) of hip    Coronary artery calcification    Chronic pain of left groin    Chronic low back pain    Cervical strain    Strain of left knee and leg    Quadriceps muscle strain    Cervical osteoarthritis    Bursitis of hip, right    Bruised rib, left, initial encounter    Benign hypertension    Chest pain, atypical    Atypical chest pain    Upper respiratory infection with cough and congestion    Acute URI    Acute lumbar radiculopathy    Foot pain, bilateral    Bilateral inguinal hernia (BIH)    Primary hypertension    Shoulder strain, left, initial encounter    Bruising    Routine general medical examination at health care facility    COVID-19    Elevated liver enzymes    Arthritis     Past Medical History:   Diagnosis Date    Acute upper respiratory infection, unspecified 11/20/2017    Acute URI    Bicipital tendinitis, left shoulder     Bicipital tendinitis of left shoulder    Effusion, right knee     Effusion of knee joint right    Encounter for removal of sutures     Visit for suture removal    Other benign neoplasm of skin of scalp and neck     Papilloma of skin of neck    Other conditions influencing health status     Myalgia and myositis    Other conditions influencing health status     Neck sprain and strain    Other specified disorders of the skin and subcutaneous tissue     Dysplastic skin lesion    Overweight     Overweight (BMI 25.0-29.9)    Personal history of other (healed) physical injury  and trauma     History of sprain of knee    Personal history of other diseases of the circulatory system     History of hypertension    Personal history of other diseases of the musculoskeletal system and connective tissue     History of low back pain    Personal history of other diseases of the respiratory system     History of pneumothorax    Radiculopathy, cervical region     Brachial neuritis    Reaction to severe stress, unspecified     Stress    Sprain of unspecified parts of thorax, initial encounter     Sprain thoracic region    Strain of muscle, fascia and tendon at neck level, initial encounter 07/10/2018    Strain of neck muscle, initial encounter     Past Surgical History:   Procedure Laterality Date    KNEE ARTHROSCOPY W/ DEBRIDEMENT  07/12/2016    Arthroscopy Knee    OTHER SURGICAL HISTORY  01/07/2020    Colonoscopy    OTHER SURGICAL HISTORY  01/07/2020    Hip replacement    OTHER SURGICAL HISTORY  01/07/2020    Back surgery     Social History     Tobacco Use    Smoking status: Never     Passive exposure: Never    Smokeless tobacco: Never   Substance Use Topics    Alcohol use: Yes     Alcohol/week: 5.0 standard drinks of alcohol     Types: 1 Glasses of wine, 1 Cans of beer, 3 Shots of liquor per week     Comment: Socially     family history includes Arthritis in an other family member; Diabetes in an other family member; Hypertension in an other family member; Lung disease in an other family member; Stroke in an other family member; cardiac disorder in an other family member.    Current Outpatient Medications:     aspirin 81 mg EC tablet, Take 1 tablet (81 mg) by mouth once daily., Disp: , Rfl:     cholecalciferol (Vitamin D-3) 25 MCG (1000 UT) capsule, Take 1 capsule (25 mcg) by mouth once daily., Disp: , Rfl:     ciclopirox (Penlac) 8 % solution, apply every night at bedtime, Disp: , Rfl:     clotrimazole-betamethasone (Lotrisone) cream, APPLY THIN COAT TO AFFECTED AREA TWICE A DAY IN THE MORNING AND  IN THE EVENING, Disp: 15 g, Rfl: 3    lisinopril 20 mg tablet, TAKE 1 TABLET BY MOUTH EVERY DAY, Disp: 90 tablet, Rfl: 1    pantoprazole (ProtoNix) 40 mg EC tablet, TAKE 1 TABLET BY MOUTH EVERY DAY, Disp: 90 tablet, Rfl: 1    simvastatin (Zocor) 40 mg tablet, TAKE 1 TABLET BY MOUTH EVERY DAY AS DIRECTED, Disp: 90 tablet, Rfl: 1  Allergies   Allergen Reactions    Chlorhexidin-Isopropyl Alcohol Unknown       Physical Examination:    A&Ox3  RUE D5, B5, T5, HG5, IO5  LUE D5, B5, T5, HG5, IO5  RLE HF 5, KE5, PF5, EHL5, DF5  LLE HF 5, KE5, PF5, EHL5, DF5     Results:  I personally reviewed and interpreted the imaging results as above    Assessment and Plan:      Edilson Live is a 73 y.o. year old male who presents to the spine clinic in follow up with lumbar stenosis.  He originally presented 2 years ago with low back pain and right lower extremity pain since then he underwent to epidural injections most recent in May of this year.  He also underwent PT treatments.  His prior MRI had shown multilevel degenerative changes with severe stenosis at L3-4 and L4-5 as well as foraminal stenosis at L3-4, L4-5 and L5-S1. Additionally he was found to have L4-L5 and L5-S1 spondylolisthesis which he was asymptomatic from.  He is a golfer and stays very active.  Today he is presenting to us for routine follow-up.  He denies any new symptoms.  He has some stiffness in the morning when he wakes up which gets better with some stretches.  He denies any new back pain or radicular leg pain.  He reports satisfactory results from his injections and is not experiencing any other significant discomfort.  He denies any bowel or bladder dysfunction.  He denies any neck pain or arm symptoms.    -Patient with a history of lumbar stenosis and previous right lower extremity radiculopathy that responded well to pain management measures and physical therapy.  Currently he is asymptomatic and does not have any new complaints.  Recommend continuing pain  management and follow-up with us on as-needed basis.

## 2024-07-16 DIAGNOSIS — K21.00 GASTRO-ESOPHAGEAL REFLUX DISEASE WITH ESOPHAGITIS, WITHOUT BLEEDING: ICD-10-CM

## 2024-07-16 RX ORDER — PANTOPRAZOLE SODIUM 40 MG/1
TABLET, DELAYED RELEASE ORAL
Qty: 90 TABLET | Refills: 1 | Status: SHIPPED | OUTPATIENT
Start: 2024-07-16

## 2024-07-23 ENCOUNTER — APPOINTMENT (OUTPATIENT)
Dept: PRIMARY CARE | Facility: CLINIC | Age: 74
End: 2024-07-23
Payer: MEDICARE

## 2024-07-23 VITALS
DIASTOLIC BLOOD PRESSURE: 84 MMHG | TEMPERATURE: 97.9 F | BODY MASS INDEX: 26.41 KG/M2 | HEIGHT: 72 IN | SYSTOLIC BLOOD PRESSURE: 148 MMHG | RESPIRATION RATE: 16 BRPM | WEIGHT: 195 LBS | HEART RATE: 65 BPM | OXYGEN SATURATION: 94 %

## 2024-07-23 DIAGNOSIS — I10 BENIGN HYPERTENSION: ICD-10-CM

## 2024-07-23 DIAGNOSIS — B35.1 ONYCHOMYCOSIS: ICD-10-CM

## 2024-07-23 DIAGNOSIS — E78.2 MIXED HYPERLIPIDEMIA: ICD-10-CM

## 2024-07-23 DIAGNOSIS — M17.0 PRIMARY OSTEOARTHRITIS OF BOTH KNEES: ICD-10-CM

## 2024-07-23 DIAGNOSIS — S46.912A SHOULDER STRAIN, LEFT, INITIAL ENCOUNTER: ICD-10-CM

## 2024-07-23 DIAGNOSIS — R74.8 ELEVATED LIVER ENZYMES: ICD-10-CM

## 2024-07-23 DIAGNOSIS — M43.10 DEGENERATIVE SPONDYLOLISTHESIS: ICD-10-CM

## 2024-07-23 DIAGNOSIS — M19.90 ARTHRITIS: ICD-10-CM

## 2024-07-23 DIAGNOSIS — I10 PRIMARY HYPERTENSION: Primary | ICD-10-CM

## 2024-07-23 DIAGNOSIS — B35.3 TINEA PEDIS OF LEFT FOOT: ICD-10-CM

## 2024-07-23 DIAGNOSIS — M16.0 PRIMARY OSTEOARTHRITIS OF BOTH HIPS: ICD-10-CM

## 2024-07-23 DIAGNOSIS — R07.89 ATYPICAL CHEST PAIN: ICD-10-CM

## 2024-07-23 DIAGNOSIS — Z12.5 SCREENING PSA (PROSTATE SPECIFIC ANTIGEN): ICD-10-CM

## 2024-07-23 DIAGNOSIS — M51.9 LUMBOSACRAL DISC DISEASE: ICD-10-CM

## 2024-07-23 LAB
ALBUMIN SERPL BCP-MCNC: 4.4 G/DL (ref 3.4–5)
ALP SERPL-CCNC: 34 U/L (ref 33–136)
ALT SERPL W P-5'-P-CCNC: 10 U/L (ref 10–52)
ANION GAP SERPL CALC-SCNC: 12 MMOL/L (ref 10–20)
AST SERPL W P-5'-P-CCNC: 17 U/L (ref 9–39)
BILIRUB SERPL-MCNC: 0.8 MG/DL (ref 0–1.2)
BUN SERPL-MCNC: 18 MG/DL (ref 6–23)
CALCIUM SERPL-MCNC: 9.2 MG/DL (ref 8.6–10.6)
CHLORIDE SERPL-SCNC: 102 MMOL/L (ref 98–107)
CHOLEST SERPL-MCNC: 136 MG/DL (ref 0–199)
CHOLESTEROL/HDL RATIO: 2.4
CO2 SERPL-SCNC: 28 MMOL/L (ref 21–32)
CREAT SERPL-MCNC: 0.82 MG/DL (ref 0.5–1.3)
EGFRCR SERPLBLD CKD-EPI 2021: >90 ML/MIN/1.73M*2
ERYTHROCYTE [DISTWIDTH] IN BLOOD BY AUTOMATED COUNT: 12.8 % (ref 11.5–14.5)
GLUCOSE SERPL-MCNC: 99 MG/DL (ref 74–99)
HCT VFR BLD AUTO: 47.2 % (ref 41–52)
HDLC SERPL-MCNC: 57.7 MG/DL
HGB BLD-MCNC: 15.8 G/DL (ref 13.5–17.5)
LDLC SERPL CALC-MCNC: 65 MG/DL
MCH RBC QN AUTO: 30.4 PG (ref 26–34)
MCHC RBC AUTO-ENTMCNC: 33.5 G/DL (ref 32–36)
MCV RBC AUTO: 91 FL (ref 80–100)
NON HDL CHOLESTEROL: 78 MG/DL (ref 0–149)
NRBC BLD-RTO: 0 /100 WBCS (ref 0–0)
PLATELET # BLD AUTO: 163 X10*3/UL (ref 150–450)
POTASSIUM SERPL-SCNC: 4.3 MMOL/L (ref 3.5–5.3)
PROT SERPL-MCNC: 6.5 G/DL (ref 6.4–8.2)
PSA SERPL-MCNC: 1.79 NG/ML
RBC # BLD AUTO: 5.19 X10*6/UL (ref 4.5–5.9)
SODIUM SERPL-SCNC: 138 MMOL/L (ref 136–145)
TRIGL SERPL-MCNC: 67 MG/DL (ref 0–149)
TSH SERPL-ACNC: 0.78 MIU/L (ref 0.44–3.98)
VLDL: 13 MG/DL (ref 0–40)
WBC # BLD AUTO: 4 X10*3/UL (ref 4.4–11.3)

## 2024-07-23 PROCEDURE — 80053 COMPREHEN METABOLIC PANEL: CPT

## 2024-07-23 PROCEDURE — 36415 COLL VENOUS BLD VENIPUNCTURE: CPT

## 2024-07-23 PROCEDURE — 1160F RVW MEDS BY RX/DR IN RCRD: CPT | Performed by: FAMILY MEDICINE

## 2024-07-23 PROCEDURE — 3008F BODY MASS INDEX DOCD: CPT | Performed by: FAMILY MEDICINE

## 2024-07-23 PROCEDURE — 3077F SYST BP >= 140 MM HG: CPT | Performed by: FAMILY MEDICINE

## 2024-07-23 PROCEDURE — 1126F AMNT PAIN NOTED NONE PRSNT: CPT | Performed by: FAMILY MEDICINE

## 2024-07-23 PROCEDURE — 1036F TOBACCO NON-USER: CPT | Performed by: FAMILY MEDICINE

## 2024-07-23 PROCEDURE — 3079F DIAST BP 80-89 MM HG: CPT | Performed by: FAMILY MEDICINE

## 2024-07-23 PROCEDURE — 80061 LIPID PANEL: CPT

## 2024-07-23 PROCEDURE — G0103 PSA SCREENING: HCPCS

## 2024-07-23 PROCEDURE — 84443 ASSAY THYROID STIM HORMONE: CPT

## 2024-07-23 PROCEDURE — 1123F ACP DISCUSS/DSCN MKR DOCD: CPT | Performed by: FAMILY MEDICINE

## 2024-07-23 PROCEDURE — 1158F ADVNC CARE PLAN TLK DOCD: CPT | Performed by: FAMILY MEDICINE

## 2024-07-23 PROCEDURE — 1159F MED LIST DOCD IN RCRD: CPT | Performed by: FAMILY MEDICINE

## 2024-07-23 PROCEDURE — 99214 OFFICE O/P EST MOD 30 MIN: CPT | Performed by: FAMILY MEDICINE

## 2024-07-23 PROCEDURE — 85027 COMPLETE CBC AUTOMATED: CPT

## 2024-07-23 RX ORDER — TERBINAFINE HYDROCHLORIDE 250 MG/1
250 TABLET ORAL DAILY
Qty: 14 TABLET | Refills: 0 | Status: SHIPPED | OUTPATIENT
Start: 2024-07-23 | End: 2024-08-06

## 2024-07-23 ASSESSMENT — PATIENT HEALTH QUESTIONNAIRE - PHQ9
1. LITTLE INTEREST OR PLEASURE IN DOING THINGS: NOT AT ALL
2. FEELING DOWN, DEPRESSED OR HOPELESS: NOT AT ALL
SUM OF ALL RESPONSES TO PHQ9 QUESTIONS 1 AND 2: 0
1. LITTLE INTEREST OR PLEASURE IN DOING THINGS: NOT AT ALL
2. FEELING DOWN, DEPRESSED OR HOPELESS: NOT AT ALL
SUM OF ALL RESPONSES TO PHQ9 QUESTIONS 1 AND 2: 0

## 2024-07-23 ASSESSMENT — PAIN SCALES - GENERAL: PAINLEVEL: 0-NO PAIN

## 2024-07-23 NOTE — PROGRESS NOTES
Subjective   Edilson Live is a 73 y.o. male who presents for Follow-up (Follow up visit today).    HPI  : Patient is a 73-year-old male who is in for recheck on blood pressure, review of medication and recheck on blood work.  He also has a problem with his toenail and he thinks he has a fungus that is not improving with topical treatment.  He may need a prescription for Lamisil tablets to take 1 daily and I will check his blood work and liver enzymes today.    Objective   : ROS : 10 systems were reviewed and the information is included in the HPI and no additional review of systems is indicated.    Physical Exam  Vitals and nursing note reviewed.   Constitutional:       Appearance: Normal appearance.      Comments: Patient is alert and oriented x3.   No acute distress   HENT:      Head: Normocephalic.      Right Ear: Tympanic membrane and external ear normal.      Left Ear: Tympanic membrane and external ear normal.      Ears:      Comments: Ears are patent bilaterally and TMs are clear.     Nose: Nose normal.      Mouth/Throat:      Mouth: Mucous membranes are moist.      Pharynx: Oropharynx is clear.      Comments: Mouth is moist, tongue is midline.  No posterior pharyngeal erythema.  Eyes:      Extraocular Movements: Extraocular movements intact.      Conjunctiva/sclera: Conjunctivae normal.      Pupils: Pupils are equal, round, and reactive to light.      Comments: No visual disturbance, does have his eyes examined once a year.   Neck:      Comments: No carotid bruits, no thyromegaly, no cervical adenopathy.  Occasional neck spasm and restriction of motion secondary to stress and tension.  Cardiovascular:      Rate and Rhythm: Normal rate and regular rhythm.      Pulses: Normal pulses.      Heart sounds: Normal heart sounds.      Comments: Patient denies chest pain and no palpitations.  Heart rhythm is stable S1 and S2 are noted, no ectopics.  Pulmonary:      Effort: Pulmonary effort is normal.      Breath sounds:  Normal breath sounds.      Comments: Patient denies any coughing or wheezing.  Lungs are clear to auscultation.    Abdominal:      General: Bowel sounds are normal.      Palpations: Abdomen is soft.      Comments: Abdomen is soft and non tender. No flank tenderness.  No suprapubic pain.  Positive bowel sounds.  Patient had a colonoscopy January 2024.  No abdominal guarding and no rebound tenderness.   Genitourinary:     Comments: Following with Urology for BPH .   Recheck  PSA  today.  Patient may need an MRI scan of his prostate.    He does not really want to go for a biopsy.  Musculoskeletal:         General: Tenderness present. Normal range of motion.      Cervical back: Normal range of motion.      Comments: Chronic  lumbosacral  disc disease.  Patient did see neurosurgery and was evaluated but does not need any surgery at this time.  He did have lumbar spine surgery approximately 11 years ago.  He does have some chronic lumbosacral disc disease but no sciatica and he currently is stable with activity and ambulation.  He is still able to play golf.  He also strained his left shoulder he thinks working out at the [facility], and I did give him some stretching exercises for the left shoulder.   Skin:     General: Skin is warm and dry.      Comments: Patient has a tenia infection on the left great toenail.  He has tried multiple topical treatments that have not been effective and he will try Lamisil tablets 1 daily sent to the pharmacy today.   Neurological:      General: No focal deficit present.      Mental Status: He is alert and oriented to person, place, and time. Mental status is at baseline.      Comments: No focal neurosensory deficits are noted.  Patient denies any peripheral neuropathy.  Coordination and gait are stable.  Normal muscle strength upper and lower extremities.   Psychiatric:         Mood and Affect: Mood normal.         Behavior: Behavior normal.         Thought Content: Thought content  normal.         Judgment: Judgment normal.      Comments: Patient has normal mood and affect.  Thought content and judgment are stable.  No signs of vascular dementia.  Behavior is normal.     PLAN : Patient is a 73-year-old male who is in today for recheck on his hypertension, review of medication, and recheck on blood work.  He does have some issues with high cholesterol and is trying to watch his diet.  He also had a previous slight jump in his PSA level and he did see urology but they said to repeat it and it will be repeated today.  He may need an MRI scan on his prostate.  Blood pressure is stable today and he is taking his statin for high cholesterol.  He also has a tenia infection on his left great toenail, onychomycosis, and he will be started on Lamisil 250 mg daily.  Patient will be notified of his blood results in 4 days and further recommendations will be made at that time.    Problem List Items Addressed This Visit       Hyperlipidemia    Atypical chest pain    Primary hypertension    Elevated liver enzymes     Other Visit Diagnoses       Screening PSA (prostate specific antigen)                     Tanmay Rosas,

## 2024-07-25 ENCOUNTER — APPOINTMENT (OUTPATIENT)
Dept: CARDIOLOGY | Facility: CLINIC | Age: 74
End: 2024-07-25
Payer: MEDICARE

## 2024-07-25 VITALS
OXYGEN SATURATION: 95 % | WEIGHT: 196 LBS | BODY MASS INDEX: 26.58 KG/M2 | DIASTOLIC BLOOD PRESSURE: 70 MMHG | SYSTOLIC BLOOD PRESSURE: 128 MMHG | HEART RATE: 58 BPM

## 2024-07-25 DIAGNOSIS — I10 BENIGN HYPERTENSION: ICD-10-CM

## 2024-07-25 DIAGNOSIS — E78.00 PURE HYPERCHOLESTEROLEMIA: Primary | ICD-10-CM

## 2024-07-25 DIAGNOSIS — R21 RASH AND OTHER NONSPECIFIC SKIN ERUPTION: ICD-10-CM

## 2024-07-25 DIAGNOSIS — I25.84 CORONARY ARTERY CALCIFICATION: ICD-10-CM

## 2024-07-25 DIAGNOSIS — I25.10 CORONARY ARTERY CALCIFICATION: ICD-10-CM

## 2024-07-25 PROCEDURE — 3074F SYST BP LT 130 MM HG: CPT | Performed by: INTERNAL MEDICINE

## 2024-07-25 PROCEDURE — 1160F RVW MEDS BY RX/DR IN RCRD: CPT | Performed by: INTERNAL MEDICINE

## 2024-07-25 PROCEDURE — 93000 ELECTROCARDIOGRAM COMPLETE: CPT | Performed by: INTERNAL MEDICINE

## 2024-07-25 PROCEDURE — 1159F MED LIST DOCD IN RCRD: CPT | Performed by: INTERNAL MEDICINE

## 2024-07-25 PROCEDURE — 1036F TOBACCO NON-USER: CPT | Performed by: INTERNAL MEDICINE

## 2024-07-25 PROCEDURE — G2211 COMPLEX E/M VISIT ADD ON: HCPCS | Performed by: INTERNAL MEDICINE

## 2024-07-25 PROCEDURE — 99213 OFFICE O/P EST LOW 20 MIN: CPT | Performed by: INTERNAL MEDICINE

## 2024-07-25 PROCEDURE — 1123F ACP DISCUSS/DSCN MKR DOCD: CPT | Performed by: INTERNAL MEDICINE

## 2024-07-25 PROCEDURE — 3078F DIAST BP <80 MM HG: CPT | Performed by: INTERNAL MEDICINE

## 2024-07-25 RX ORDER — CELECOXIB 50 MG/1
200 CAPSULE ORAL DAILY
COMMUNITY

## 2024-07-25 RX ORDER — CLOTRIMAZOLE AND BETAMETHASONE DIPROPIONATE 10; .64 MG/G; MG/G
CREAM TOPICAL 2 TIMES DAILY
Qty: 45 G | Refills: 3 | Status: SHIPPED | OUTPATIENT
Start: 2024-07-25

## 2024-07-25 NOTE — PROGRESS NOTES
Chief Complaint:   Follow-up (Patient is here for yearly follow up)     History Of Present Illness:    Edilson Live is a 73 y.o. male presenting with cv dz.   Patient denies chest pain/SOB/palpitations/dizziness/lightheadedness/edema/claudication  Active-walks/golfs /rec center    Last Recorded Vitals:  Vitals:    07/25/24 0945 07/25/24 1002   BP: 135/74 128/70   BP Location: Right arm    Patient Position: Sitting    BP Cuff Size: Adult    Pulse: 58    SpO2: 95%    Weight: 88.9 kg (196 lb)             Allergies:  Chlorhexidin-isopropyl alcohol    Outpatient Medications:  Current Outpatient Medications   Medication Instructions    aspirin 81 mg EC tablet 1 tablet, oral, Daily    celecoxib (CELEBREX) 50 mg, oral, 2 times daily    cholecalciferol (Vitamin D-3) 25 MCG (1000 UT) capsule 1 capsule, oral, Daily    ciclopirox (Penlac) 8 % solution apply every night at bedtime    clotrimazole-betamethasone (Lotrisone) cream APPLY THIN COAT TO AFFECTED AREA TWICE A DAY IN THE MORNING AND IN THE EVENING    lisinopril 20 mg, oral, Daily    pantoprazole (ProtoNix) 40 mg EC tablet TAKE 1 TABLET BY MOUTH EVERY DAY    simvastatin (ZOCOR) 40 mg, oral, Daily, as directed    terbinafine (LAMISIL) 250 mg, oral, Daily       Physical Exam:  Constitutional:       Appearance: Healthy appearance. Not in distress.   Neck:      Vascular: No JVR. JVD normal.   Pulmonary:      Effort: Pulmonary effort is normal.      Breath sounds: Normal breath sounds. No wheezing. No rhonchi. No rales.   Chest:      Chest wall: Not tender to palpatation.   Cardiovascular:      PMI at left midclavicular line. Normal rate. Regular rhythm. Normal S1. Normal S2.       Murmurs: There is no murmur.      No gallop.  No click. No rub.   Pulses:     Intact distal pulses.   Edema:     Peripheral edema absent.   Abdominal:      General: Bowel sounds are normal.      Palpations: Abdomen is soft.      Tenderness: There is no abdominal tenderness.   Musculoskeletal: Normal  "range of motion.         General: No tenderness. Skin:     General: Skin is warm and dry.   Neurological:      General: No focal deficit present.      Mental Status: Alert and oriented to person, place and time.          Last Labs:  CBC -  Lab Results   Component Value Date    WBC 4.0 (L) 07/23/2024    HGB 15.8 07/23/2024    HCT 47.2 07/23/2024    MCV 91 07/23/2024     07/23/2024       CMP -  Lab Results   Component Value Date    CALCIUM 9.2 07/23/2024    PROT 6.5 07/23/2024    ALBUMIN 4.4 07/23/2024    AST 17 07/23/2024    ALT 10 07/23/2024    ALKPHOS 34 07/23/2024    BILITOT 0.8 07/23/2024       LIPID PANEL -   Lab Results   Component Value Date    CHOL 136 07/23/2024    TRIG 67 07/23/2024    HDL 57.7 07/23/2024    CHHDL 2.4 07/23/2024    VLDL 13 07/23/2024   Ldl=65           RENAL FUNCTION PANEL -   Lab Results   Component Value Date    GLUCOSE 99 07/23/2024     07/23/2024    K 4.3 07/23/2024     07/23/2024    CO2 28 07/23/2024    ANIONGAP 12 07/23/2024    BUN 18 07/23/2024    CREATININE 0.82 07/23/2024    GFRMALE >90 04/04/2023    CALCIUM 9.2 07/23/2024    ALBUMIN 4.4 07/23/2024        No results found for: \"BNP\", \"HGBA1C\"              Lab review: I have personally reviewed the laboratory result(s)       Problem List Items Addressed This Visit       Hyperlipidemia - Primary    Overview     On moderate intensity statin   7/2024 lipids excellent         Relevant Orders    ECG 12 Lead (Completed)    Coronary artery calcification    Overview     355 Agatston units per 12/2021 CT   On ASA / statin   No angina or ischemic EKG changes   2/2022 stress echo with good exercise tolerance / no ischemia           Benign hypertension    Overview     BP well controlled                Rai Grimm, DO  "

## 2024-07-25 NOTE — RESULT ENCOUNTER NOTE
White blood cell count is still little bit low at 4000    that is what it was last time 4 months ago        should be above 4400     we will just continue to monitor that    Red blood cell count is normal      blood sugar, kidney and liver function are normal      cholesterol is good at 136   triglycerides are normal at 67    thyroid function is normal      PSA level looks good at 1.79    last year it was 1.81 he can discuss that PSA with urology         blood work overall looks stable    he can recheck it again in 4 to 5 months.

## 2024-07-31 DIAGNOSIS — E78.5 HYPERLIPIDEMIA, UNSPECIFIED: ICD-10-CM

## 2024-07-31 DIAGNOSIS — I10 ESSENTIAL (PRIMARY) HYPERTENSION: ICD-10-CM

## 2024-07-31 RX ORDER — SIMVASTATIN 40 MG/1
40 TABLET, FILM COATED ORAL DAILY
Qty: 90 TABLET | Refills: 1 | Status: SHIPPED | OUTPATIENT
Start: 2024-07-31

## 2024-07-31 RX ORDER — SIMVASTATIN 40 MG/1
40 TABLET, FILM COATED ORAL DAILY
COMMUNITY
Start: 2024-07-31

## 2024-07-31 RX ORDER — LISINOPRIL 20 MG/1
20 TABLET ORAL DAILY
COMMUNITY
Start: 2024-07-31

## 2024-08-06 DIAGNOSIS — B35.3 TINEA PEDIS OF LEFT FOOT: ICD-10-CM

## 2024-08-06 DIAGNOSIS — B35.1 ONYCHOMYCOSIS: Primary | ICD-10-CM

## 2024-08-06 RX ORDER — TERBINAFINE HYDROCHLORIDE 250 MG/1
250 TABLET ORAL DAILY
Qty: 30 TABLET | Refills: 2 | Status: SHIPPED | OUTPATIENT
Start: 2024-08-06 | End: 2024-11-04

## 2024-08-14 ENCOUNTER — OFFICE VISIT (OUTPATIENT)
Dept: PAIN MEDICINE | Facility: CLINIC | Age: 74
End: 2024-08-14
Payer: MEDICARE

## 2024-08-14 VITALS
WEIGHT: 196 LBS | SYSTOLIC BLOOD PRESSURE: 178 MMHG | RESPIRATION RATE: 15 BRPM | HEIGHT: 72 IN | HEART RATE: 60 BPM | DIASTOLIC BLOOD PRESSURE: 88 MMHG | OXYGEN SATURATION: 98 % | TEMPERATURE: 96.1 F | BODY MASS INDEX: 26.55 KG/M2

## 2024-08-14 DIAGNOSIS — M51.9 LUMBOSACRAL DISC DISEASE: ICD-10-CM

## 2024-08-14 DIAGNOSIS — M96.1 LUMBAR POSTLAMINECTOMY SYNDROME: Primary | ICD-10-CM

## 2024-08-14 DIAGNOSIS — M54.16 LUMBAR NEURITIS: ICD-10-CM

## 2024-08-14 PROCEDURE — 99214 OFFICE O/P EST MOD 30 MIN: CPT | Performed by: ANESTHESIOLOGY

## 2024-08-14 ASSESSMENT — PAIN - FUNCTIONAL ASSESSMENT: PAIN_FUNCTIONAL_ASSESSMENT: 0-10

## 2024-08-14 ASSESSMENT — PAIN SCALES - GENERAL
PAINLEVEL_OUTOF10: 1
PAINLEVEL: 1

## 2024-08-14 ASSESSMENT — ENCOUNTER SYMPTOMS
OCCASIONAL FEELINGS OF UNSTEADINESS: 0
LOSS OF SENSATION IN FEET: 0

## 2024-08-14 ASSESSMENT — PAIN DESCRIPTION - DESCRIPTORS: DESCRIPTORS: ACHING

## 2024-08-14 NOTE — PROGRESS NOTES
History Of Present Illness  Edilson Live is a 73 y.o. male presenting with   Chief Complaint   Patient presents with    Follow-up     Patient returns for follow up for ONGOING IMPROVED chronic low back pain to the RLE thru his buttock and down the lateral right knee.     Recall: He is s/p below lumbar surgery Dr. Bernstein in 2016 and Caudal epidural in May of 2024.      The pain is constant, worse with activity and better with rest. The pain is sharp, stabbing and shooting to the B/L LE. Denies LE paresthesias, weakness, saddle anesthesia, bowel or bladder incontinence. To manage this pain the patient has attempted Advil with 10-20% relief of his pain. The patients chronic GERD, HTN, DLD are stable.      PSHx  -Laminectomy, facetectomy and foraminotomy with Dr. Bernstein in 2016     -LTHA (Dr. Lopez)  -Hernia repair   -Left knee arthroscopy (Dr. Lopez)      SocHx  -Denies any tobacco  -Worked desk work in Finance at [employer]     PAIN SCORE: 1-5/10     Previous Pain: Dr. Guzman ([facility])     Past Medical History  He has a past medical history of Acute upper respiratory infection, unspecified (11/20/2017), Bicipital tendinitis, left shoulder, Effusion, right knee, Encounter for removal of sutures, Other benign neoplasm of skin of scalp and neck, Other conditions influencing health status, Other conditions influencing health status, Other specified disorders of the skin and subcutaneous tissue, Overweight, Personal history of other (healed) physical injury and trauma, Personal history of other diseases of the circulatory system, Personal history of other diseases of the musculoskeletal system and connective tissue, Personal history of other diseases of the respiratory system, Radiculopathy, cervical region, Reaction to severe stress, unspecified, Sprain of unspecified parts of thorax, initial encounter, and Strain of muscle, fascia and tendon at neck level, initial encounter (07/10/2018).    Surgical History  He has a past  surgical history that includes Other surgical history (01/07/2020); Other surgical history (01/07/2020); Other surgical history (01/07/2020); and Knee arthroscopy w/ debridement (07/12/2016).     Social History  He reports that he has never smoked. He has never been exposed to tobacco smoke. He has never used smokeless tobacco. He reports current alcohol use of about 5.0 standard drinks of alcohol per week. He reports that he does not use drugs.    Family History  Family History   Problem Relation Name Age of Onset    Arthritis Other multiple family members     Diabetes Other multiple family members     Hypertension Other multiple family members     Stroke Other multiple family members     Other (cardiac disorder) Other multiple family members     Lung disease Other multiple family members         Allergies  Chlorhexidin-isopropyl alcohol    Review of Systems     Physical Exam     Last Recorded Vitals  /88   Pulse 60   Temp 35.6 °C (96.1 °F)   Resp 15   Wt 88.9 kg (196 lb)   SpO2 98%     General: Pt appears stated age     Eyes: Conjunctiva non-icteric and lids without obvious rash or drooping. Pupils are symmetric     ENT: External ears are without deformity or rash. Hearing is grossly intact     Neck: No JVD noted, tracheal position midline.      Respiratory: No gasping or shortness of breath noted, no use of accessory muscles noted     Cardiovascular: Extremities show no edema or varicosities     Skin: No rashes or open lesions/ulcers identified on skin.      Musculoskeletal: Gait is grossly normal     Digits/nails show no clubbing or cyanosis     Exam of muscles/joints/bones shows no gross atrophy and no abnormal/involuntary movements in the head/neckNo asymmetry or masses noted in the head/neck     Stability: no subluxation noted on movement of bilateral upper extremities or head/neck     Strength: 4/5 in RLE , 5/5 in LLE      Range of Motion: WNL      Sensation: In tact      Cranial nerves 2-12 are  grossly intact     Psychiatric: Pt is alert and oriented to time, place and person.       Assessment/Plan   1. Lumbar postlaminectomy syndrome        2. Lumbosacral disc disease        3. Lumbar neuritis            1. I have previously provided the patient with a list of physical therapy exercises to learn and perform to strengthen core, maintain stabilization, and reduce pain. We reviewed the exercises in detail and I encouraged them to perform them on a regular basis.     Pt reports therapy has been working very well for him and he continues on this.      2. I did previously discuss starting him on Gabapentin to help with nerve related pain. We discussed the risks, benefits, and side effects to this medication including the mechanism of action and the pt understands and will hold off for now.      3. I extensively reviewed the patients MRI findings (2016 prior to surgery) in detail, including review of the actual images and provided a detailed explanation of the findings using a spine model.      There is mild diffuse disc bulge at the L3/4 level.      There is diffuse disc bulge and moderate to severe bilateral neural foraminal stenosis. There is also noted a 6mm perineural cyst at the L4 nerve root level.     At the L5/S1 moderate right and moderate to severe left neural foraminal stenosis and suspected pars defects at L5 level.      4. The patient is a candidate for an CAUDAL vs to treat back and radicular pain. I spent time with the patient discussing all of the risks, benefits, and alternatives to this measure. Including but not limited to spinal infection, epidural hematoma/abscess, paralysis, nerve injury, steroid effects, and spinal headache. The patient understands and agrees.      He underwent his injection on 5-, 6-, 3- and 50-60% relief of his pain that is ONGOING for now. He has been able to stand / walk / bend with much less pain.      5. I reviewed his MRI from [facility] from  5-2023.      I spent time with the patient reviewing their imaging and discussing the risks benefits and alternatives to the above plan. A total of 30 minutes was spent reviewing the data and greater than 50% of that time was with the patient during the face to face encounter discussing treatment options both surgical, non-surgical, and minimally invasive techniques.        Vicente Duke MD

## 2024-09-23 ENCOUNTER — TELEPHONE (OUTPATIENT)
Dept: PRIMARY CARE | Facility: CLINIC | Age: 74
End: 2024-09-23
Payer: MEDICARE

## 2024-09-24 ENCOUNTER — TELEPHONE (OUTPATIENT)
Dept: PAIN MEDICINE | Facility: CLINIC | Age: 74
End: 2024-09-24
Payer: MEDICARE

## 2024-09-24 DIAGNOSIS — M51.9 LUMBOSACRAL DISC DISEASE: ICD-10-CM

## 2024-09-26 ENCOUNTER — TELEPHONE (OUTPATIENT)
Dept: PAIN MEDICINE | Facility: CLINIC | Age: 74
End: 2024-09-26
Payer: MEDICARE

## 2024-09-27 DIAGNOSIS — M54.16 LUMBAR NEURITIS: Primary | ICD-10-CM

## 2024-09-27 NOTE — TELEPHONE ENCOUNTER
Edilson called and would like to schedule another Injection.  Orders need to be put in if this is okay.    Thank you!  
He left another  requesting injection. Orders need to be placed or does he need an office visit  
Orders are in please call to schedule. 
- - -

## 2024-10-24 ENCOUNTER — HOSPITAL ENCOUNTER (OUTPATIENT)
Dept: PAIN MEDICINE | Facility: CLINIC | Age: 74
Discharge: HOME | End: 2024-10-24
Payer: MEDICARE

## 2024-10-24 VITALS
RESPIRATION RATE: 18 BRPM | OXYGEN SATURATION: 96 % | HEART RATE: 67 BPM | DIASTOLIC BLOOD PRESSURE: 76 MMHG | TEMPERATURE: 97.7 F | SYSTOLIC BLOOD PRESSURE: 131 MMHG

## 2024-10-24 DIAGNOSIS — M54.16 LUMBAR NEURITIS: ICD-10-CM

## 2024-10-24 PROCEDURE — 2500000005 HC RX 250 GENERAL PHARMACY W/O HCPCS: Performed by: ANESTHESIOLOGY

## 2024-10-24 PROCEDURE — 2500000004 HC RX 250 GENERAL PHARMACY W/ HCPCS (ALT 636 FOR OP/ED): Performed by: ANESTHESIOLOGY

## 2024-10-24 PROCEDURE — 62323 NJX INTERLAMINAR LMBR/SAC: CPT | Performed by: ANESTHESIOLOGY

## 2024-10-24 RX ORDER — LIDOCAINE HYDROCHLORIDE 5 MG/ML
INJECTION, SOLUTION INFILTRATION; INTRAVENOUS AS NEEDED
Status: COMPLETED | OUTPATIENT
Start: 2024-10-24 | End: 2024-10-24

## 2024-10-24 RX ORDER — TRIAMCINOLONE ACETONIDE 40 MG/ML
INJECTION, SUSPENSION INTRA-ARTICULAR; INTRAMUSCULAR AS NEEDED
Status: COMPLETED | OUTPATIENT
Start: 2024-10-24 | End: 2024-10-24

## 2024-10-24 RX ORDER — SODIUM CHLORIDE 9 MG/ML
INJECTION, SOLUTION INTRAMUSCULAR; INTRAVENOUS; SUBCUTANEOUS AS NEEDED
Status: COMPLETED | OUTPATIENT
Start: 2024-10-24 | End: 2024-10-24

## 2024-10-24 ASSESSMENT — LIFESTYLE VARIABLES
SKIP TO QUESTIONS 9-10: 1
HOW MANY STANDARD DRINKS CONTAINING ALCOHOL DO YOU HAVE ON A TYPICAL DAY: PATIENT DOES NOT DRINK
AUDIT-C TOTAL SCORE: 0
HOW OFTEN DO YOU HAVE SIX OR MORE DRINKS ON ONE OCCASION: NEVER
HOW OFTEN DO YOU HAVE A DRINK CONTAINING ALCOHOL: NEVER

## 2024-10-24 ASSESSMENT — COLUMBIA-SUICIDE SEVERITY RATING SCALE - C-SSRS
2. HAVE YOU ACTUALLY HAD ANY THOUGHTS OF KILLING YOURSELF?: NO
1. IN THE PAST MONTH, HAVE YOU WISHED YOU WERE DEAD OR WISHED YOU COULD GO TO SLEEP AND NOT WAKE UP?: NO
6. HAVE YOU EVER DONE ANYTHING, STARTED TO DO ANYTHING, OR PREPARED TO DO ANYTHING TO END YOUR LIFE?: NO

## 2024-10-24 ASSESSMENT — PATIENT HEALTH QUESTIONNAIRE - PHQ9
SUM OF ALL RESPONSES TO PHQ9 QUESTIONS 1 AND 2: 0
2. FEELING DOWN, DEPRESSED OR HOPELESS: NOT AT ALL
1. LITTLE INTEREST OR PLEASURE IN DOING THINGS: NOT AT ALL

## 2024-10-24 ASSESSMENT — PAIN SCALES - GENERAL
PAINLEVEL_OUTOF10: 2
PAINLEVEL_OUTOF10: 0 - NO PAIN

## 2024-10-24 ASSESSMENT — PAIN - FUNCTIONAL ASSESSMENT
PAIN_FUNCTIONAL_ASSESSMENT: 0-10
PAIN_FUNCTIONAL_ASSESSMENT: 0-10

## 2024-10-24 ASSESSMENT — PAIN DESCRIPTION - DESCRIPTORS: DESCRIPTORS: ACHING;SHARP

## 2024-10-24 ASSESSMENT — ENCOUNTER SYMPTOMS
LOSS OF SENSATION IN FEET: 0
DEPRESSION: 0
OCCASIONAL FEELINGS OF UNSTEADINESS: 0

## 2024-11-15 DIAGNOSIS — E78.5 HYPERLIPIDEMIA, UNSPECIFIED: ICD-10-CM

## 2024-11-15 DIAGNOSIS — I10 ESSENTIAL (PRIMARY) HYPERTENSION: ICD-10-CM

## 2024-11-15 RX ORDER — LISINOPRIL 20 MG/1
20 TABLET ORAL DAILY
Qty: 90 TABLET | Refills: 3 | Status: SHIPPED | OUTPATIENT
Start: 2024-11-15

## 2024-11-15 RX ORDER — SIMVASTATIN 40 MG/1
40 TABLET, FILM COATED ORAL DAILY
Qty: 90 TABLET | Refills: 3 | Status: SHIPPED | OUTPATIENT
Start: 2024-11-15

## 2024-11-26 ENCOUNTER — APPOINTMENT (OUTPATIENT)
Dept: PRIMARY CARE | Facility: CLINIC | Age: 74
End: 2024-11-26
Payer: MEDICARE

## 2024-11-26 VITALS
OXYGEN SATURATION: 97 % | HEIGHT: 72 IN | HEART RATE: 60 BPM | BODY MASS INDEX: 27.36 KG/M2 | DIASTOLIC BLOOD PRESSURE: 82 MMHG | SYSTOLIC BLOOD PRESSURE: 149 MMHG | WEIGHT: 202 LBS

## 2024-11-26 DIAGNOSIS — M19.90 ARTHRITIS: ICD-10-CM

## 2024-11-26 DIAGNOSIS — E78.2 MIXED HYPERLIPIDEMIA: Primary | ICD-10-CM

## 2024-11-26 DIAGNOSIS — I10 BENIGN HYPERTENSION: ICD-10-CM

## 2024-11-26 DIAGNOSIS — M54.17 LUMBOSACRAL RADICULITIS: ICD-10-CM

## 2024-11-26 DIAGNOSIS — M51.9 LUMBOSACRAL DISC DISEASE: ICD-10-CM

## 2024-11-26 DIAGNOSIS — R53.81 MALAISE AND FATIGUE: ICD-10-CM

## 2024-11-26 DIAGNOSIS — R53.83 MALAISE AND FATIGUE: ICD-10-CM

## 2024-11-26 LAB
ALBUMIN SERPL BCP-MCNC: 4.3 G/DL (ref 3.4–5)
ALP SERPL-CCNC: 33 U/L (ref 33–136)
ALT SERPL W P-5'-P-CCNC: 12 U/L (ref 10–52)
ANION GAP SERPL CALC-SCNC: 12 MMOL/L (ref 10–20)
AST SERPL W P-5'-P-CCNC: 16 U/L (ref 9–39)
BILIRUB SERPL-MCNC: 0.7 MG/DL (ref 0–1.2)
BUN SERPL-MCNC: 17 MG/DL (ref 6–23)
CALCIUM SERPL-MCNC: 9.5 MG/DL (ref 8.6–10.6)
CHLORIDE SERPL-SCNC: 102 MMOL/L (ref 98–107)
CHOLEST SERPL-MCNC: 146 MG/DL (ref 0–199)
CHOLESTEROL/HDL RATIO: 2.2
CO2 SERPL-SCNC: 30 MMOL/L (ref 21–32)
CREAT SERPL-MCNC: 0.75 MG/DL (ref 0.5–1.3)
EGFRCR SERPLBLD CKD-EPI 2021: >90 ML/MIN/1.73M*2
ERYTHROCYTE [DISTWIDTH] IN BLOOD BY AUTOMATED COUNT: 13.2 % (ref 11.5–14.5)
GLUCOSE SERPL-MCNC: 95 MG/DL (ref 74–99)
HCT VFR BLD AUTO: 43.9 % (ref 41–52)
HDLC SERPL-MCNC: 67.2 MG/DL
HGB BLD-MCNC: 14.8 G/DL (ref 13.5–17.5)
LDLC SERPL CALC-MCNC: 68 MG/DL
MCH RBC QN AUTO: 31 PG (ref 26–34)
MCHC RBC AUTO-ENTMCNC: 33.7 G/DL (ref 32–36)
MCV RBC AUTO: 92 FL (ref 80–100)
NON HDL CHOLESTEROL: 79 MG/DL (ref 0–149)
NRBC BLD-RTO: 0 /100 WBCS (ref 0–0)
PLATELET # BLD AUTO: 153 X10*3/UL (ref 150–450)
POTASSIUM SERPL-SCNC: 4.2 MMOL/L (ref 3.5–5.3)
PROT SERPL-MCNC: 6.2 G/DL (ref 6.4–8.2)
RBC # BLD AUTO: 4.77 X10*6/UL (ref 4.5–5.9)
SODIUM SERPL-SCNC: 140 MMOL/L (ref 136–145)
TRIGL SERPL-MCNC: 54 MG/DL (ref 0–149)
VLDL: 11 MG/DL (ref 0–40)
WBC # BLD AUTO: 3.6 X10*3/UL (ref 4.4–11.3)

## 2024-11-26 PROCEDURE — 80053 COMPREHEN METABOLIC PANEL: CPT

## 2024-11-26 PROCEDURE — 1123F ACP DISCUSS/DSCN MKR DOCD: CPT | Performed by: FAMILY MEDICINE

## 2024-11-26 PROCEDURE — 1159F MED LIST DOCD IN RCRD: CPT | Performed by: FAMILY MEDICINE

## 2024-11-26 PROCEDURE — 85027 COMPLETE CBC AUTOMATED: CPT

## 2024-11-26 PROCEDURE — 1036F TOBACCO NON-USER: CPT | Performed by: FAMILY MEDICINE

## 2024-11-26 PROCEDURE — 80061 LIPID PANEL: CPT

## 2024-11-26 PROCEDURE — 99214 OFFICE O/P EST MOD 30 MIN: CPT | Performed by: FAMILY MEDICINE

## 2024-11-26 PROCEDURE — 3008F BODY MASS INDEX DOCD: CPT | Performed by: FAMILY MEDICINE

## 2024-11-26 PROCEDURE — 3077F SYST BP >= 140 MM HG: CPT | Performed by: FAMILY MEDICINE

## 2024-11-26 PROCEDURE — 1126F AMNT PAIN NOTED NONE PRSNT: CPT | Performed by: FAMILY MEDICINE

## 2024-11-26 PROCEDURE — 3079F DIAST BP 80-89 MM HG: CPT | Performed by: FAMILY MEDICINE

## 2024-11-26 RX ORDER — GABAPENTIN 100 MG/1
100 CAPSULE ORAL 3 TIMES DAILY
Qty: 90 CAPSULE | Refills: 5 | Status: SHIPPED | OUTPATIENT
Start: 2024-11-26 | End: 2025-05-25

## 2024-11-26 RX ORDER — CELECOXIB 200 MG/1
200 CAPSULE ORAL DAILY
Qty: 90 CAPSULE | Refills: 3 | Status: SHIPPED | OUTPATIENT
Start: 2024-11-26 | End: 2025-11-21

## 2024-11-26 ASSESSMENT — ENCOUNTER SYMPTOMS
OCCASIONAL FEELINGS OF UNSTEADINESS: 0
DEPRESSION: 0
LOSS OF SENSATION IN FEET: 0

## 2024-11-26 ASSESSMENT — PAIN SCALES - GENERAL: PAINLEVEL_OUTOF10: 0-NO PAIN

## 2024-11-26 NOTE — PROGRESS NOTES
Subjective   Patient ID: Edilson Live is a 74 y.o. male who presents for Follow-up (bloodwork).    HPI   : Patient is a 74-year-old male who is in for follow-up on hypertension and dyslipidemia.  He has also had issues with chronic lower back pain and was enrolled in physical therapy since he could not play golf the past 2 months due to lower back pain and sciatica.  I did have a long discussion with him and it is feeling better but he would like to try gabapentin and see if that helps his sciatica pain.  He tries to stay very active playing golf and going to the recreation center since he retired but due to his back problems his activity has been limited.  He did have previous lumbar spine back surgery in the past.    Review of Systems  : 10 systems were reviewed and the information is included in the HPI and no additional review of systems is indicated.    Physical Exam  Vitals and nursing note reviewed.   Constitutional:       Appearance: Normal appearance.      Comments: Patient is alert and oriented x3.   No acute distress   HENT:      Head: Normocephalic.      Right Ear: Tympanic membrane and external ear normal.      Left Ear: Tympanic membrane and external ear normal.      Ears:      Comments: Ears are patent bilaterally and TMs are clear.     Nose: Nose normal.      Mouth/Throat:      Mouth: Mucous membranes are moist.      Pharynx: Oropharynx is clear.      Comments: Mouth is moist, tongue is midline.  No posterior pharyngeal erythema.  Eyes:      Extraocular Movements: Extraocular movements intact.      Conjunctiva/sclera: Conjunctivae normal.      Pupils: Pupils are equal, round, and reactive to light.      Comments: No visual disturbance, does have his  eyes examined once a year.   Neck:      Comments: No carotid bruits, no thyromegaly, no cervical adenopathy.  Occasional neck spasm and restriction of motion secondary to stress and tension.  Cardiovascular:      Rate and Rhythm: Normal rate and regular  rhythm.      Pulses: Normal pulses.      Heart sounds: Normal heart sounds.      Comments: Patient denies chest pain and no palpitations.  Heart rhythm is stable S1 and S2 are noted, no ectopics.  Pulmonary:      Effort: Pulmonary effort is normal.      Breath sounds: Normal breath sounds.      Comments: Patient denies any coughing or wheezing.  Lungs are clear to auscultation.  Past history of a pneumothorax many years ago.  Abdominal:      General: Bowel sounds are normal.      Palpations: Abdomen is soft.      Comments: Abdomen is soft and non tender.  No flank tenderness.  No suprapubic pain.  Positive bowel sounds x4.  No abdominal guarding and no rebound tenderness.   Genitourinary:     Comments: Patient denies dysuria, no hematuria, denies flank pain.  Nocturia and did see Urology.  PSA was in normal range.  Musculoskeletal:         General: Tenderness present. Normal range of motion.      Cervical back: Normal range of motion and neck supple.      Comments: Lumbosacral  radiculitis .  Patient did have previous lumbar back surgery 12 years ago.  Age-related arthritis in the joints.  Went to physical therapy end of September.   Restriction of motion cervical and lumbar spines due to arthritis, and muscle spasm.  Patient will try gabapentin and see if it helps with some of his sciatica pain.  Left hand flexor tendonitis and trigger finger.  Patient needs to have surgery on his left hand trigger finger since it is quite severe.   Skin:     General: Skin is warm.      Comments: There is no bruising, no erythema, no skin lesions noted, no rashes.   Neurological:      General: No focal deficit present.      Mental Status: He is alert and oriented to person, place, and time. Mental status is at baseline.      Sensory: Sensory deficit present.      Comments: Sciatica  and paresthesias  lower extremities.   Patient will try gabapentin to get some relief.  No focal neurosensory deficits are noted.  Patient denies any  peripheral neuropathy.  Coordination and gait are stable.  Normal muscle strength upper and lower extremities.   Psychiatric:         Mood and Affect: Mood normal.         Behavior: Behavior normal.         Thought Content: Thought content normal.         Judgment: Judgment normal.      Comments: Patient has normal mood and affect.  Thought content and judgment are stable.  No signs of vascular dementia.  Behavior is normal.     PLAN : Patient is a 74-year-old male who is in for recheck on his hypertension, lipids, blood counts and liver enzymes.  He also still having some lower back problems and is in physical therapy.  He also went back in for a spinal nerve block from pain management.  He will try gabapentin to see if it gives him any relief.  Patient will be notified of his blood results in 3 days and further recommendations will be made at that time.  He did already receive his flu shot at the pharmacy.        Encounter Diagnoses   Name Primary?    Benign hypertension     Mixed hyperlipidemia     Malaise and fatigue       Patient Active Problem List   Diagnosis    Abnormal screening cardiac CT    Acute bronchitis    Bronchitis    Acute maxillary sinusitis    Abnormal skin growth    Vitamin D deficiency    Throat irritation    Strain of groin    Rash of back    Groin rash    Status post left hip replacement    Skin lesion of right leg    Sinusitis    Right knee DJD    Right inguinal hernia    Insomnia    Hip pain, acute, left    Left-sided back pain    Liver lesion    Lumbar herniated disc    Lumbar neuritis    Lumbar postlaminectomy syndrome    Lumbosacral disc disease    Lumbosacral strain    Metatarsalgia of both feet    Osteoarthritis of knees, bilateral    Osteoarthritis, hip, bilateral    Pain of left side of body    Persistent cough    Primary osteoarthritis of left knee    Hyperlipidemia    Hand tendonitis    Infection of skin    Gout    Gastroesophageal reflux disease with esophagitis    Flank strain,  initial encounter    Fibroepithelial papilloma    Fatigue    Erectile dysfunction    Elevated blood pressure reading with diagnosis of hypertension    Easy bruising    Dysfunction of both eustachian tubes    Degenerative joint disease (DJD) of hip    Coronary artery calcification    Chronic pain of left groin    Chronic low back pain    Cervical strain    Strain of left knee and leg    Quadriceps muscle strain    Cervical osteoarthritis    Bursitis of hip, right    Bruised rib, left, initial encounter    Benign hypertension    Chest pain, atypical    Atypical chest pain    Upper respiratory infection with cough and congestion    Acute URI    Acute lumbar radiculopathy    Foot pain, bilateral    Bilateral inguinal hernia (BIH)    Primary hypertension    Shoulder strain, left, initial encounter    Bruising    Routine general medical examination at health care facility    COVID-19    Elevated liver enzymes    Arthritis    Degenerative spondylolisthesis    Injury of neck    Overweight with body mass index (BMI) 25.0-29.9    Tinea pedis of left foot    Screening PSA (prostate specific antigen)    Onychomycosis      Allergies   Allergen Reactions    Chlorhexidin-Isopropyl Alcohol Unknown      Medication Documentation Review Audit       Reviewed by Jodi Ramírez LPN (Licensed Nurse) on 10/24/24 at 1432      Medication Order Taking? Sig Documenting Provider Last Dose Status   aspirin 81 mg EC tablet [id number] Yes Take 1 tablet (81 mg) by mouth once daily. Historical Provider, MD Past Week Active   celecoxib (CeleBREX) 50 mg capsule [id number] Yes Take 4 capsules (200 mg) by mouth once daily. Estella Armendariz MD 10/24/2024 Active   cholecalciferol (Vitamin D-3) 25 MCG (1000 UT) capsule [id number] Yes Take 1 capsule (25 mcg) by mouth once daily. Estella Armendariz MD 10/24/2024 Active   ciclopirox (Penlac) 8 % solution [id number] Yes apply every night at bedtime Historical Provider, MD 10/24/2024 Active    clotrimazole-betamethasone (Lotrisone) cream [id number] Yes Apply topically 2 times a day. Tanmay Rosas, DO 10/24/2024 Active   lisinopril 20 mg tablet [id number] Yes TAKE 1 TABLET BY MOUTH EVERY DAY Rai MARCUS Alfa, DO 10/24/2024 Active   pantoprazole (ProtoNix) 40 mg EC tablet [id number] Yes TAKE 1 TABLET BY MOUTH EVERY DAY Tanmay Rosas, DO 10/24/2024 Active   simvastatin (Zocor) 40 mg tablet [id number] Yes Take 1 tablet (40 mg) by mouth once daily. as directed Tanmay Rosas, DO 10/24/2024 Active   terbinafine (LamISIL) 250 mg tablet [id number] No Take 1 tablet (250 mg) by mouth once daily.   Patient not taking: Reported on 10/24/2024    Tanmay Rosas DO More than a month Active                    Past Medical History:   Diagnosis Date    Acute upper respiratory infection, unspecified 11/20/2017    Acute URI    Bicipital tendinitis, left shoulder     Bicipital tendinitis of left shoulder    Effusion, right knee     Effusion of knee joint right    Encounter for removal of sutures     Visit for suture removal    Other benign neoplasm of skin of scalp and neck     Papilloma of skin of neck    Other conditions influencing health status     Myalgia and myositis    Other conditions influencing health status     Neck sprain and strain    Other specified disorders of the skin and subcutaneous tissue     Dysplastic skin lesion    Overweight     Overweight (BMI 25.0-29.9)    Personal history of other (healed) physical injury and trauma     History of sprain of knee    Personal history of other diseases of the circulatory system     History of hypertension    Personal history of other diseases of the musculoskeletal system and connective tissue     History of low back pain    Personal history of other diseases of the respiratory system     History of pneumothorax    Radiculopathy, cervical region     Brachial neuritis    Reaction to severe stress, unspecified     Stress    Sprain of unspecified parts  of thorax, initial encounter     Sprain thoracic region    Strain of muscle, fascia and tendon at neck level, initial encounter 07/10/2018    Strain of neck muscle, initial encounter     Social History     Tobacco Use   Smoking Status Never    Passive exposure: Never   Smokeless Tobacco Never     Family History   Problem Relation Name Age of Onset    Arthritis Other multiple family members     Diabetes Other multiple family members     Hypertension Other multiple family members     Stroke Other multiple family members     Other (cardiac disorder) Other multiple family members     Lung disease Other multiple family members       Past Surgical History:   Procedure Laterality Date    KNEE ARTHROSCOPY W/ DEBRIDEMENT  07/12/2016    Arthroscopy Knee    OTHER SURGICAL HISTORY  01/07/2020    Colonoscopy    OTHER SURGICAL HISTORY  01/07/2020    Hip replacement    OTHER SURGICAL HISTORY  01/07/2020    Back surgery       Assessment/Plan         UMA DONNELLY MA

## 2024-11-29 NOTE — RESULT ENCOUNTER NOTE
Blood sugar, kidney and liver function are normal    total protein was a little bit low at 6.2 and should be above 6.4    he can try to eat more protein in his diet.     White blood cell count was 3600 and should be over 4400   ,   it is still little bit low and 4 months ago was 4000.   I would just recheck that again in 3 or 4 months, unless he is sick or running a fever then I would check it sooner.      Cholesterol is normal at 146   triglycerides are normal at 54      other than the low protein and low white blood cell count     the other blood work is normal and we can recheck   the white blood cell count in  4 months.

## 2024-12-24 ENCOUNTER — TELEPHONE (OUTPATIENT)
Dept: PRIMARY CARE | Facility: CLINIC | Age: 74
End: 2024-12-24

## 2024-12-24 DIAGNOSIS — J06.9 URI WITH COUGH AND CONGESTION: Primary | ICD-10-CM

## 2024-12-24 RX ORDER — BROMPHENIRAMINE MALEATE, PSEUDOEPHEDRINE HYDROCHLORIDE, AND DEXTROMETHORPHAN HYDROBROMIDE 2; 30; 10 MG/5ML; MG/5ML; MG/5ML
5 SYRUP ORAL 3 TIMES DAILY PRN
Qty: 120 ML | Refills: 0 | Status: SHIPPED | OUTPATIENT
Start: 2024-12-24 | End: 2025-01-03

## 2024-12-24 RX ORDER — CEPHALEXIN 500 MG/1
500 CAPSULE ORAL 2 TIMES DAILY
Qty: 20 CAPSULE | Refills: 0 | Status: SHIPPED | OUTPATIENT
Start: 2024-12-24 | End: 2025-01-03

## 2024-12-24 NOTE — TELEPHONE ENCOUNTER
Patient is having a sore throat, chest congestion and cough. He is asking for a prescription to be sent in to [facility] in [place], Please advise?

## 2025-01-06 DIAGNOSIS — J40 BRONCHITIS: Primary | ICD-10-CM

## 2025-01-06 RX ORDER — DOXYCYCLINE 100 MG/1
100 CAPSULE ORAL 2 TIMES DAILY
Qty: 20 CAPSULE | Refills: 0 | Status: SHIPPED | OUTPATIENT
Start: 2025-01-06 | End: 2025-01-16

## 2025-01-06 RX ORDER — BROMPHENIRAMINE MALEATE, PSEUDOEPHEDRINE HYDROCHLORIDE, AND DEXTROMETHORPHAN HYDROBROMIDE 2; 30; 10 MG/5ML; MG/5ML; MG/5ML
5 SYRUP ORAL 4 TIMES DAILY PRN
Qty: 120 ML | Refills: 0 | Status: SHIPPED | OUTPATIENT
Start: 2025-01-06 | End: 2025-01-16

## 2025-01-08 DIAGNOSIS — K21.00 GASTRO-ESOPHAGEAL REFLUX DISEASE WITH ESOPHAGITIS, WITHOUT BLEEDING: ICD-10-CM

## 2025-01-08 RX ORDER — PANTOPRAZOLE SODIUM 40 MG/1
TABLET, DELAYED RELEASE ORAL
Qty: 90 TABLET | Refills: 1 | Status: SHIPPED | OUTPATIENT
Start: 2025-01-08

## 2025-01-13 ENCOUNTER — APPOINTMENT (OUTPATIENT)
Dept: PRIMARY CARE | Facility: CLINIC | Age: 75
End: 2025-01-13
Payer: MEDICARE

## 2025-01-13 VITALS
SYSTOLIC BLOOD PRESSURE: 130 MMHG | BODY MASS INDEX: 26.95 KG/M2 | HEART RATE: 79 BPM | WEIGHT: 199 LBS | RESPIRATION RATE: 18 BRPM | DIASTOLIC BLOOD PRESSURE: 77 MMHG | HEIGHT: 72 IN | TEMPERATURE: 97.6 F | OXYGEN SATURATION: 97 %

## 2025-01-13 DIAGNOSIS — J20.9 ACUTE BRONCHITIS, UNSPECIFIED ORGANISM: Primary | ICD-10-CM

## 2025-01-13 DIAGNOSIS — M19.90 ARTHRITIS: ICD-10-CM

## 2025-01-13 DIAGNOSIS — R53.83 OTHER FATIGUE: ICD-10-CM

## 2025-01-13 DIAGNOSIS — M54.40 CHRONIC MIDLINE LOW BACK PAIN WITH SCIATICA, SCIATICA LATERALITY UNSPECIFIED: ICD-10-CM

## 2025-01-13 DIAGNOSIS — R35.1 BENIGN PROSTATIC HYPERPLASIA WITH NOCTURIA: ICD-10-CM

## 2025-01-13 DIAGNOSIS — N40.1 BENIGN PROSTATIC HYPERPLASIA WITH NOCTURIA: ICD-10-CM

## 2025-01-13 DIAGNOSIS — G89.29 CHRONIC MIDLINE LOW BACK PAIN WITH SCIATICA, SCIATICA LATERALITY UNSPECIFIED: ICD-10-CM

## 2025-01-13 PROCEDURE — 3078F DIAST BP <80 MM HG: CPT | Performed by: FAMILY MEDICINE

## 2025-01-13 PROCEDURE — 99214 OFFICE O/P EST MOD 30 MIN: CPT | Performed by: FAMILY MEDICINE

## 2025-01-13 PROCEDURE — 3008F BODY MASS INDEX DOCD: CPT | Performed by: FAMILY MEDICINE

## 2025-01-13 PROCEDURE — 1123F ACP DISCUSS/DSCN MKR DOCD: CPT | Performed by: FAMILY MEDICINE

## 2025-01-13 PROCEDURE — 3075F SYST BP GE 130 - 139MM HG: CPT | Performed by: FAMILY MEDICINE

## 2025-01-13 PROCEDURE — 1036F TOBACCO NON-USER: CPT | Performed by: FAMILY MEDICINE

## 2025-01-13 PROCEDURE — 1160F RVW MEDS BY RX/DR IN RCRD: CPT | Performed by: FAMILY MEDICINE

## 2025-01-13 PROCEDURE — 1159F MED LIST DOCD IN RCRD: CPT | Performed by: FAMILY MEDICINE

## 2025-01-13 PROCEDURE — 1126F AMNT PAIN NOTED NONE PRSNT: CPT | Performed by: FAMILY MEDICINE

## 2025-01-13 RX ORDER — ALBUTEROL SULFATE 90 UG/1
2 INHALANT RESPIRATORY (INHALATION) EVERY 6 HOURS PRN
Qty: 8 G | Refills: 1 | Status: SHIPPED | OUTPATIENT
Start: 2025-01-13 | End: 2026-01-13

## 2025-01-13 ASSESSMENT — PAIN SCALES - GENERAL: PAINLEVEL_OUTOF10: 0-NO PAIN

## 2025-01-13 NOTE — PROGRESS NOTES
Subjective   Edilson Live is a 74 y.o. male who presents for Sick Visit (Patient here with complaint of severe /chronic cough).    HPI  :  Patient had bronchitis since Elaine.   Still coughing  and concerned about Bronchitis.  Patient  will also need an inhaler to use along with his antibiotic he is taking.  He states his wife was also sick and she had bronchitis.  He also needs a referral to urology for a second opinion.    Objective  : ROS : 10 systems were reviewed and the information is included in the HPI and no additional review of systems is indicated.    Physical Exam  Vitals and nursing note reviewed.   Constitutional:       Appearance: Normal appearance.      Comments: Patient is alert and oriented x3.   No acute distress   HENT:      Head: Normocephalic.      Right Ear: Tympanic membrane and external ear normal.      Left Ear: Tympanic membrane and external ear normal.      Ears:      Comments: Ears are patent bilaterally and TMs are clear.     Nose: Nose normal.      Mouth/Throat:      Mouth: Mucous membranes are moist.      Pharynx: Oropharynx is clear.      Comments: Mouth is moist, tongue is coated . .  No posterior pharyngeal erythema.  Eyes:      General: Scleral icterus: albuterol.      Extraocular Movements: Extraocular movements intact.      Conjunctiva/sclera: Conjunctivae normal.      Pupils: Pupils are equal, round, and reactive to light.      Comments: No visual disturbance, does have his  eyes examined once a year.   Neck:      Comments: No carotid bruits, no thyromegaly, no cervical adenopathy.  Occasional neck spasm and restriction of motion secondary to stress and tension.  Cardiovascular:      Rate and Rhythm: Normal rate and regular rhythm.      Pulses: Normal pulses.      Heart sounds: Normal heart sounds.      Comments: Patient denies chest pain and no palpitations.  Heart rhythm is stable S1 and S2 are noted, no ectopics.  Pulmonary:      Effort: Pulmonary effort is normal.       Breath sounds: Rhonchi present.      Comments: Patient was sick with Bronchitis  since Christmas  and still  coughing  since.  Lungs with left basilar rhonchi to auscultation and patient will be given a albuterol inhaler to use.  He is still taking antibiotics.  Abdominal:      General: Bowel sounds are normal.      Palpations: Abdomen is soft.      Comments: Abdomen is soft and non tender. No flank tenderness.  No suprapubic pain.    No abdominal guarding and no rebound tenderness.   Genitourinary:     Comments: Patient denies dysuria, no hematuria,  denies flank pain.  Does have some BPH with nocturia and would like to see a urologist at [facility] for second opinion.  Musculoskeletal:         General: Normal range of motion.      Cervical back: Normal range of motion and neck supple.      Comments: Age-related arthritis in the joints.  Mild restriction of motion cervical and lumbar spines due to muscle spasm.   Previous lower back surgery.    Skin:     General: Skin is warm and dry.      Comments: There is no bruising, no erythema, no skin lesions noted, no rashes.   Neurological:      General: No focal deficit present.      Mental Status: He is alert and oriented to person, place, and time. Mental status is at baseline.      Comments: No focal neurosensory deficits are noted.  Patient denies any peripheral neuropathy.  Coordination and gait are stable.  Normal muscle strength upper and lower extremities.   Psychiatric:         Mood and Affect: Mood normal.         Behavior: Behavior normal.         Thought Content: Thought content normal.         Judgment: Judgment normal.      Comments: Patient has normal mood and affect.  Thought content and judgment are stable.  No signs of vascular dementia.  Behavior is normal.     PLAN : Patient is a 74-year-old male who has had bronchitis on and off since Christmas.  I did treat him with antibiotics on 2 occasions and he is still taking the Vibramycin.  I will give  him a albuterol inhaler to use and hopefully this will clear him the next few days.  If he is not better by next week he will need a chest x-ray.  He also wanted a referral for second opinion from a neurologist at [facility].  He was given this referral) out we will schedule the appointment.  He will call next week if he is not completely over the bronchitis.  Blood pressure and other vitals are stable-and his pulse ox is also stable at 97 %.    Problem List Items Addressed This Visit    None           Tanmay Rosas, DO

## 2025-01-20 ENCOUNTER — HOSPITAL ENCOUNTER (OUTPATIENT)
Dept: RADIOLOGY | Facility: CLINIC | Age: 75
Discharge: HOME | End: 2025-01-20
Payer: MEDICARE

## 2025-01-20 DIAGNOSIS — R05.2 SUBACUTE COUGH: ICD-10-CM

## 2025-01-20 DIAGNOSIS — J40 BRONCHITIS: Primary | ICD-10-CM

## 2025-01-20 DIAGNOSIS — J40 BRONCHITIS: ICD-10-CM

## 2025-01-20 PROCEDURE — 71046 X-RAY EXAM CHEST 2 VIEWS: CPT

## 2025-01-20 PROCEDURE — 71046 X-RAY EXAM CHEST 2 VIEWS: CPT | Performed by: RADIOLOGY

## 2025-01-23 NOTE — RESULT ENCOUNTER NOTE
Tell patient the chest x-ray looks normal     no signs of pneumonia or infection.    Hopefully his cough and congestion will continue to get better

## 2025-01-24 ENCOUNTER — OFFICE VISIT (OUTPATIENT)
Dept: PAIN MEDICINE | Facility: CLINIC | Age: 75
End: 2025-01-24
Payer: MEDICARE

## 2025-01-24 VITALS
DIASTOLIC BLOOD PRESSURE: 77 MMHG | SYSTOLIC BLOOD PRESSURE: 129 MMHG | HEART RATE: 80 BPM | OXYGEN SATURATION: 96 % | RESPIRATION RATE: 16 BRPM | WEIGHT: 199 LBS | TEMPERATURE: 96.6 F | BODY MASS INDEX: 26.95 KG/M2 | HEIGHT: 72 IN

## 2025-01-24 DIAGNOSIS — M96.1 LUMBAR POSTLAMINECTOMY SYNDROME: Primary | ICD-10-CM

## 2025-01-24 DIAGNOSIS — M51.26 LUMBAR HERNIATED DISC: ICD-10-CM

## 2025-01-24 DIAGNOSIS — M54.16 LUMBAR NEURITIS: ICD-10-CM

## 2025-01-24 DIAGNOSIS — G89.29 CHRONIC MIDLINE LOW BACK PAIN WITH SCIATICA, SCIATICA LATERALITY UNSPECIFIED: ICD-10-CM

## 2025-01-24 DIAGNOSIS — M54.40 CHRONIC MIDLINE LOW BACK PAIN WITH SCIATICA, SCIATICA LATERALITY UNSPECIFIED: ICD-10-CM

## 2025-01-24 DIAGNOSIS — M51.9 LUMBOSACRAL DISC DISEASE: ICD-10-CM

## 2025-01-24 PROCEDURE — 99214 OFFICE O/P EST MOD 30 MIN: CPT | Performed by: ANESTHESIOLOGY

## 2025-01-24 ASSESSMENT — ENCOUNTER SYMPTOMS
OCCASIONAL FEELINGS OF UNSTEADINESS: 0
LOSS OF SENSATION IN FEET: 0

## 2025-01-24 ASSESSMENT — PAIN SCALES - GENERAL
PAINLEVEL_OUTOF10: 1
PAINLEVEL_OUTOF10: 1

## 2025-01-24 ASSESSMENT — PAIN - FUNCTIONAL ASSESSMENT: PAIN_FUNCTIONAL_ASSESSMENT: 0-10

## 2025-01-24 ASSESSMENT — PAIN DESCRIPTION - DESCRIPTORS: DESCRIPTORS: DULL;ACHING

## 2025-01-24 NOTE — PROGRESS NOTES
History Of Present Illness  Edilson Live is a 74 y.o. male presenting with   Chief Complaint   Patient presents with    Follow-up     Patient returns for follow up for ONGOING IMPROVED chronic low back pain to the RLE thru his buttock and down the lateral right knee.     Recall: He is s/p below lumbar surgery Dr. Bernstein in 2016 and Caudal epidural in May of 2024.      The pain is constant, worse with activity and better with rest. The pain is sharp, stabbing and shooting to the B/L LE. Denies LE paresthesias, weakness, saddle anesthesia, bowel or bladder incontinence. To manage this pain the patient has attempted Advil with 10-20% relief of his pain. The patients chronic GERD, HTN, DLD are stable.      PSHx  -Laminectomy, facetectomy and foraminotomy with Dr. Bernstein in 2016     -LTHA (Dr. Lopez)  -Hernia repair   -Left knee arthroscopy (Dr. Lopez)      SocHx  -Denies any tobacco  -Worked desk work in Finance at [employer]     PAIN SCORE: 1-5/10     Previous Pain: Dr. Guzman ([employer])     Past Medical History  He has a past medical history of Acute upper respiratory infection, unspecified (11/20/2017), Bicipital tendinitis, left shoulder, Effusion, right knee, Encounter for removal of sutures, Other benign neoplasm of skin of scalp and neck, Other conditions influencing health status, Other conditions influencing health status, Other specified disorders of the skin and subcutaneous tissue, Overweight, Personal history of other (healed) physical injury and trauma, Personal history of other diseases of the circulatory system, Personal history of other diseases of the musculoskeletal system and connective tissue, Personal history of other diseases of the respiratory system, Radiculopathy, cervical region, Reaction to severe stress, unspecified, Sprain of unspecified parts of thorax, initial encounter, and Strain of muscle, fascia and tendon at neck level, initial encounter (07/10/2018).    Surgical History  He has a past  surgical history that includes Other surgical history (01/07/2020); Other surgical history (01/07/2020); Other surgical history (01/07/2020); and Knee arthroscopy w/ debridement (07/12/2016).     Social History  He reports that he has never smoked. He has never been exposed to tobacco smoke. He has never used smokeless tobacco. He reports current alcohol use of about 5.0 standard drinks of alcohol per week. Drug use questions deferred to the physician.    Family History  Family History   Problem Relation Name Age of Onset    Arthritis Other multiple family members     Diabetes Other multiple family members     Hypertension Other multiple family members     Stroke Other multiple family members     Other (cardiac disorder) Other multiple family members     Lung disease Other multiple family members         Allergies  Chlorhexidin-isopropyl alcohol    Review of Systems     Physical Exam     Last Recorded Vitals  /77   Pulse 80   Temp 35.9 °C (96.6 °F)   Resp 16   Wt 90.3 kg (199 lb)   SpO2 96%     General: Pt appears stated age     Eyes: Conjunctiva non-icteric and lids without obvious rash or drooping. Pupils are symmetric     ENT: External ears are without deformity or rash. Hearing is grossly intact     Neck: No JVD noted, tracheal position midline.      Respiratory: No gasping or shortness of breath noted, no use of accessory muscles noted     Cardiovascular: Extremities show no edema or varicosities     Skin: No rashes or open lesions/ulcers identified on skin.      Musculoskeletal: Gait is grossly normal     Digits/nails show no clubbing or cyanosis     Exam of muscles/joints/bones shows no gross atrophy and no abnormal/involuntary movements in the head/neckNo asymmetry or masses noted in the head/neck     Stability: no subluxation noted on movement of bilateral upper extremities or head/neck     Strength: 4/5 in RLE , 5/5 in LLE      Range of Motion: WNL      Sensation: In tact      Cranial nerves  2-12 are grossly intact     Psychiatric: Pt is alert and oriented to time, place and person.       Assessment/Plan   1. Lumbar postlaminectomy syndrome        2. Lumbosacral disc disease        3. Lumbar neuritis        4. Lumbar herniated disc        5. Chronic midline low back pain with sciatica, sciatica laterality unspecified              1. I have previously provided the patient with a list of physical therapy exercises to learn and perform to strengthen core, maintain stabilization, and reduce pain. We reviewed the exercises in detail and I encouraged them to perform them on a regular basis.     Pt reports therapy has been working very well for him and he continues on this.      2. I did previously discuss starting him on Gabapentin to help with nerve related pain. We discussed the risks, benefits, and side effects to this medication including the mechanism of action and the pt understands and will hold off for now.     He reports his PCP gave him a prescription for this and he has it at home for now, but is not taking it.      3. I reviewed the patients MRI findings (2016 prior to surgery) in detail, including review of the actual images and provided a detailed explanation of the findings using a spine model.      There is mild diffuse disc bulge at the L3/4 level.      There is diffuse disc bulge and moderate to severe bilateral neural foraminal stenosis. There is also noted a 6mm perineural cyst at the L4 nerve root level.     At the L5/S1 moderate right and moderate to severe left neural foraminal stenosis and suspected pars defects at L5 level.      4. The patient is a candidate for an CAUDAL vs to treat back and radicular pain. I spent time with the patient discussing all of the risks, benefits, and alternatives to this measure. Including but not limited to spinal infection, epidural hematoma/abscess, paralysis, nerve injury, steroid effects, and spinal headache. The patient understands and agrees.       He underwent his injection on 5-, 6-, 3- and 50-60% relief of his pain that is ONGOING for now. He has been able to stand / walk / bend with much less pain.      5. I previously reviewed his MRI from [facility] from 5-2023.      I spent time with the patient reviewing their imaging and discussing the risks benefits and alternatives to the above plan. A total of 30 minutes was spent reviewing the data and greater than 50% of that time was with the patient during the face to face encounter discussing treatment options both surgical, non-surgical, and minimally invasive techniques.        Vicente Duke MD

## 2025-02-06 DIAGNOSIS — J20.9 ACUTE BRONCHITIS, UNSPECIFIED ORGANISM: ICD-10-CM

## 2025-02-07 RX ORDER — ALBUTEROL SULFATE 90 UG/1
2 INHALANT RESPIRATORY (INHALATION) EVERY 4 HOURS PRN
Qty: 8 G | Refills: 5 | Status: SHIPPED | OUTPATIENT
Start: 2025-02-07 | End: 2026-02-07

## 2025-03-20 ENCOUNTER — APPOINTMENT (OUTPATIENT)
Facility: CLINIC | Age: 75
End: 2025-03-20
Payer: MEDICARE

## 2025-03-20 VITALS
SYSTOLIC BLOOD PRESSURE: 169 MMHG | BODY MASS INDEX: 27.71 KG/M2 | WEIGHT: 204.6 LBS | HEIGHT: 72 IN | DIASTOLIC BLOOD PRESSURE: 89 MMHG | HEART RATE: 69 BPM | TEMPERATURE: 97.5 F

## 2025-03-20 DIAGNOSIS — R35.1 BENIGN PROSTATIC HYPERPLASIA WITH NOCTURIA: ICD-10-CM

## 2025-03-20 DIAGNOSIS — R97.20 ELEVATED PSA: Primary | ICD-10-CM

## 2025-03-20 DIAGNOSIS — J20.9 ACUTE BRONCHITIS, UNSPECIFIED ORGANISM: ICD-10-CM

## 2025-03-20 DIAGNOSIS — N40.1 BENIGN PROSTATIC HYPERPLASIA WITH NOCTURIA: ICD-10-CM

## 2025-03-20 PROCEDURE — 1160F RVW MEDS BY RX/DR IN RCRD: CPT | Performed by: STUDENT IN AN ORGANIZED HEALTH CARE EDUCATION/TRAINING PROGRAM

## 2025-03-20 PROCEDURE — G2211 COMPLEX E/M VISIT ADD ON: HCPCS | Performed by: STUDENT IN AN ORGANIZED HEALTH CARE EDUCATION/TRAINING PROGRAM

## 2025-03-20 PROCEDURE — 1123F ACP DISCUSS/DSCN MKR DOCD: CPT | Performed by: STUDENT IN AN ORGANIZED HEALTH CARE EDUCATION/TRAINING PROGRAM

## 2025-03-20 PROCEDURE — 1159F MED LIST DOCD IN RCRD: CPT | Performed by: STUDENT IN AN ORGANIZED HEALTH CARE EDUCATION/TRAINING PROGRAM

## 2025-03-20 PROCEDURE — 3008F BODY MASS INDEX DOCD: CPT | Performed by: STUDENT IN AN ORGANIZED HEALTH CARE EDUCATION/TRAINING PROGRAM

## 2025-03-20 PROCEDURE — 3077F SYST BP >= 140 MM HG: CPT | Performed by: STUDENT IN AN ORGANIZED HEALTH CARE EDUCATION/TRAINING PROGRAM

## 2025-03-20 PROCEDURE — 1036F TOBACCO NON-USER: CPT | Performed by: STUDENT IN AN ORGANIZED HEALTH CARE EDUCATION/TRAINING PROGRAM

## 2025-03-20 PROCEDURE — 3079F DIAST BP 80-89 MM HG: CPT | Performed by: STUDENT IN AN ORGANIZED HEALTH CARE EDUCATION/TRAINING PROGRAM

## 2025-03-20 PROCEDURE — 99203 OFFICE O/P NEW LOW 30 MIN: CPT | Performed by: STUDENT IN AN ORGANIZED HEALTH CARE EDUCATION/TRAINING PROGRAM

## 2025-03-20 NOTE — PROGRESS NOTES
Chief complaint:  Advice Only  Referring physician:  Tanmay Rosas DO     SUBJECTIVE:  HPI:  Edilson Live is a 74 y.o. male with a history of HTN, HLD, GERD, OA with chronic LBP, Dupuytren's contracture, on ppx asa 81mg who presents for initial evaluation of elevated PSA.    Has had stable PSA on screening around 1.8 for years.  Seen by [facility], had 2 PSA results in the 3 range.  Was recommended biopsy, coming for another opinion.  Unfortunately I do not have the U PSA values to review.  No family history of prostate cancer or breast cancer.  Sister had some kind of kidney thing that has not caused her any long term issues.  Prior DREs wnl.  No prior biopsies.  Maybe one UTI prior, no gross hematuria.  One UA with microscopic blood, repeat negative.  Never smoker.  No LUTS, nocturia x0-1.  Mild-moderate ED well controlled with sildenafil 80mg prn.  Denies curvature.  No imaging available to review prostate size.    Medical history:   has a past medical history of Acute upper respiratory infection, unspecified (11/20/2017), Bicipital tendinitis, left shoulder, Effusion, right knee, Encounter for removal of sutures, Other benign neoplasm of skin of scalp and neck, Other conditions influencing health status, Other conditions influencing health status, Other specified disorders of the skin and subcutaneous tissue, Overweight, Personal history of other (healed) physical injury and trauma, Personal history of other diseases of the circulatory system, Personal history of other diseases of the musculoskeletal system and connective tissue, Personal history of other diseases of the respiratory system, Radiculopathy, cervical region, Reaction to severe stress, unspecified, Sprain of unspecified parts of thorax, initial encounter, and Strain of muscle, fascia and tendon at neck level, initial encounter (07/10/2018).   Surgical history:   has a past surgical history that includes Other surgical history (01/07/2020); Other  surgical history (01/07/2020); Other surgical history (01/07/2020); and Knee arthroscopy w/ debridement (07/12/2016).  Family history:  family history includes Arthritis in an other family member; Diabetes in an other family member; Hypertension in an other family member; Lung disease in an other family member; Stroke in an other family member; cardiac disorder in an other family member.  Social history:   reports that he has never smoked. He has never been exposed to tobacco smoke. He has never used smokeless tobacco. He reports current alcohol use of about 5.0 standard drinks of alcohol per week. Drug use questions deferred to the physician.    Medications:    Current Outpatient Medications   Medication Instructions    albuterol (Ventolin HFA) 90 mcg/actuation inhaler 2 puffs, inhalation, Every 4 hours PRN    albuterol 90 mcg/actuation inhaler 2 puffs, inhalation, Every 6 hours PRN    aspirin 81 mg EC tablet 1 tablet, Daily    celecoxib (CELEBREX) 200 mg, oral, Daily    cholecalciferol (Vitamin D-3) 25 MCG (1000 UT) capsule 1 capsule, Daily    ciclopirox (Penlac) 8 % solution apply every night at bedtime    clotrimazole-betamethasone (Lotrisone) cream Topical, 2 times daily    gabapentin (NEURONTIN) 100 mg, oral, 3 times daily    lisinopril 20 mg, oral, Daily    pantoprazole (ProtoNix) 40 mg EC tablet TAKE 1 TABLET BY MOUTH EVERY DAY    simvastatin (ZOCOR) 40 mg, oral, Daily, as directed      Allergies:    Allergies   Allergen Reactions    Chlorhexidin-Isopropyl Alcohol Unknown        ROS:  14-point review of systems negative except as noted above.    OBJECTIVE:  Visit Vitals  /89   Pulse 69   Temp 36.4 °C (97.5 °F)   Body mass index is 27.75 kg/m².    Physical exam  General:  No acute distress  HEENT:  EOMI  CV:  Regular rate  Pulm:  Nonlabored respirations  Abd:  Soft, non-distended  :  No suprapubic or CVA tenderness  MSK:  No contractures  Neuro:  Motor intact  Psych:  Appropriate affect    Labs:   "  Lab Results   Component Value Date    WBC 3.6 (L) 11/26/2024    HGB 14.8 11/26/2024    HCT 43.9 11/26/2024     11/26/2024    ALT 12 11/26/2024    AST 16 11/26/2024     11/26/2024    K 4.2 11/26/2024     11/26/2024    CREATININE 0.75 11/26/2024    BUN 17 11/26/2024    CO2 30 11/26/2024   No results found for: \"URINECULTURE\" No results found for: \"PSA\"    Imaging:  All imaging discussed in HPI was independently reviewed.    ASSESSMENT:  Elevated PSA  Erectile dysfunction due to arterial insufficiency    Discussed interpretation of elevated PSA, role of MRI of prostate before biopsy and pre-test risk stratification using PCPT data.  At this point will start with repeating PSA at 6 month interval from last (November 2024 at [facility]).    PLAN:  Repeat PSA total 1 week prior to next visit  Continue sildenafil 80mg prn    Follow-up June or July with PSA    Neil Thacker MD    Problem List Items Addressed This Visit       Acute bronchitis    Benign prostatic hyperplasia with nocturia     Other Visit Diagnoses       Elevated PSA    -  Primary    Relevant Orders    Prostate Specific Antigen             "

## 2025-03-26 ENCOUNTER — APPOINTMENT (OUTPATIENT)
Dept: PRIMARY CARE | Facility: CLINIC | Age: 75
End: 2025-03-26
Payer: MEDICARE

## 2025-03-26 VITALS
DIASTOLIC BLOOD PRESSURE: 84 MMHG | TEMPERATURE: 97.9 F | HEIGHT: 72 IN | SYSTOLIC BLOOD PRESSURE: 148 MMHG | HEART RATE: 65 BPM | OXYGEN SATURATION: 98 % | BODY MASS INDEX: 27.63 KG/M2 | RESPIRATION RATE: 16 BRPM | WEIGHT: 204 LBS

## 2025-03-26 DIAGNOSIS — R53.83 MALAISE AND FATIGUE: ICD-10-CM

## 2025-03-26 DIAGNOSIS — M47.16 LUMBAR SPONDYLOSIS WITH MYELOPATHY: Primary | ICD-10-CM

## 2025-03-26 DIAGNOSIS — D72.819 LEUKOPENIA, UNSPECIFIED TYPE: ICD-10-CM

## 2025-03-26 DIAGNOSIS — M54.16 LUMBAR NEURITIS: ICD-10-CM

## 2025-03-26 DIAGNOSIS — N40.1 BENIGN PROSTATIC HYPERPLASIA WITH NOCTURIA: ICD-10-CM

## 2025-03-26 DIAGNOSIS — E55.9 VITAMIN D DEFICIENCY: ICD-10-CM

## 2025-03-26 DIAGNOSIS — I10 PRIMARY HYPERTENSION: ICD-10-CM

## 2025-03-26 DIAGNOSIS — E78.5 DYSLIPIDEMIA: ICD-10-CM

## 2025-03-26 DIAGNOSIS — M51.9 LUMBOSACRAL DISC DISEASE: ICD-10-CM

## 2025-03-26 DIAGNOSIS — M17.0 PRIMARY OSTEOARTHRITIS OF BOTH KNEES: ICD-10-CM

## 2025-03-26 DIAGNOSIS — I10 ELEVATED BLOOD PRESSURE READING WITH DIAGNOSIS OF HYPERTENSION: ICD-10-CM

## 2025-03-26 DIAGNOSIS — R35.1 BENIGN PROSTATIC HYPERPLASIA WITH NOCTURIA: ICD-10-CM

## 2025-03-26 DIAGNOSIS — Z96.642 STATUS POST LEFT HIP REPLACEMENT: ICD-10-CM

## 2025-03-26 DIAGNOSIS — R53.81 MALAISE AND FATIGUE: ICD-10-CM

## 2025-03-26 PROCEDURE — 1160F RVW MEDS BY RX/DR IN RCRD: CPT | Performed by: FAMILY MEDICINE

## 2025-03-26 PROCEDURE — 99214 OFFICE O/P EST MOD 30 MIN: CPT | Performed by: FAMILY MEDICINE

## 2025-03-26 PROCEDURE — 1159F MED LIST DOCD IN RCRD: CPT | Performed by: FAMILY MEDICINE

## 2025-03-26 PROCEDURE — 1036F TOBACCO NON-USER: CPT | Performed by: FAMILY MEDICINE

## 2025-03-26 PROCEDURE — 3079F DIAST BP 80-89 MM HG: CPT | Performed by: FAMILY MEDICINE

## 2025-03-26 PROCEDURE — 3077F SYST BP >= 140 MM HG: CPT | Performed by: FAMILY MEDICINE

## 2025-03-26 PROCEDURE — 1126F AMNT PAIN NOTED NONE PRSNT: CPT | Performed by: FAMILY MEDICINE

## 2025-03-26 PROCEDURE — 3008F BODY MASS INDEX DOCD: CPT | Performed by: FAMILY MEDICINE

## 2025-03-26 PROCEDURE — 1123F ACP DISCUSS/DSCN MKR DOCD: CPT | Performed by: FAMILY MEDICINE

## 2025-03-26 ASSESSMENT — PATIENT HEALTH QUESTIONNAIRE - PHQ9
2. FEELING DOWN, DEPRESSED OR HOPELESS: NOT AT ALL
SUM OF ALL RESPONSES TO PHQ9 QUESTIONS 1 AND 2: 0
1. LITTLE INTEREST OR PLEASURE IN DOING THINGS: NOT AT ALL

## 2025-03-26 ASSESSMENT — PAIN SCALES - GENERAL: PAINLEVEL_OUTOF10: 0-NO PAIN

## 2025-03-26 NOTE — PROGRESS NOTES
Subjective   Edilson Live is a 74 y.o. male who presents for Follow-up (Follow up visit, blood pressure check and blood work completed today).    HPI  : Patient is a 74 year old  male in for follow up  exam , blood work  , and review of mediation.   Patient still has lower back problems and follows with pain management.  He also has been through extensive physical therapy.  Patient also recently saw Urology for consultation for BPH and evaluation of his prostate.  On patient's last blood test his white blood cell count was a little bit low and that will be reevaluated today.  He otherwise has not had any new problems or complaints other than the bronchitis he had in February.    Objective  : ROS : 10 systems were reviewed and the information is included in the HPI and no additional review of systems is indicated.    Physical Exam  Vitals and nursing note reviewed.   Constitutional:       Appearance: Normal appearance. He is normal weight.      Comments: Patient is alert and oriented x3.   No acute distress   HENT:      Head: Normocephalic.      Right Ear: Tympanic membrane and external ear normal.      Left Ear: Tympanic membrane and external ear normal.      Ears:      Comments: Ears are patent bilaterally and TMs are clear.     Nose: Nose normal.      Mouth/Throat:      Mouth: Mucous membranes are moist.      Pharynx: Oropharynx is clear.      Comments: Mouth is moist, tongue is midline.  No posterior pharyngeal erythema.  Eyes:      Extraocular Movements: Extraocular movements intact.      Conjunctiva/sclera: Conjunctivae normal.      Pupils: Pupils are equal, round, and reactive to light.      Comments: No visual disturbance, does have his eyes examined once a year.   Neck:      Comments: No carotid bruits, no thyromegaly, no cervical adenopathy.  Occasional neck spasm and restriction of motion secondary to stress and tension.  Cardiovascular:      Rate and Rhythm: Normal rate and regular rhythm.      Pulses:  Normal pulses.      Heart sounds: Normal heart sounds.      Comments: Patient denies chest pain and no palpitations.  Heart rhythm is stable S1 and S2 are noted, no ectopics.  Pulmonary:      Effort: Pulmonary effort is normal.      Breath sounds: Normal breath sounds.      Comments: Patient had a bad case of bronchitis in February but is currently feeling better and not coughing.  I did review his most recent chest x-ray.  Patient denies any coughing or wheezing.  Lungs are clear to auscultation.  Patient does have a history of a small spontaneous pneumothorax many years ago and has been stable.  Abdominal:      General: Bowel sounds are normal.      Palpations: Abdomen is soft.      Comments: Abdomen is soft and non tender.  No flank tenderness.  No suprapubic pain.  Positive bowel sounds .  Patient had a colonoscopy January 2024.  No abdominal guarding and no rebound tenderness.   Genitourinary:     Comments: Recently saw urology for prostate check.  Has a follow-up appointment in 6 months.  Musculoskeletal:         General: Tenderness present. Normal range of motion.      Cervical back: Normal range of motion.      Comments: Patient has a history of lumbosacral degenerative disc disease and previous lumbar spine surgery 12 years ago.  He did have some recent physical therapy and an epidural nerve block.  Age-related arthritis in the joints. Restriction of motion cervical and lumbar spines due to degenerative disc disease.  Patient had recent surgery for trigger fingers done by orthopedics.   Skin:     General: Skin is warm.      Comments: There is no bruising, no erythema, no skin lesions noted, no rashes.   Neurological:      General: No focal deficit present.      Mental Status: He is alert and oriented to person, place, and time. Mental status is at baseline.      Sensory: Sensory deficit present.      Comments: Occasional paresthesias in the lower extremity from lumbosacral disc disease, and previous lumbar  back surgery.  No focal neurosensory deficits are noted.   Coordination and gait are stable.  Normal muscle strength upper and lower extremities.   Psychiatric:         Mood and Affect: Mood normal.         Behavior: Behavior normal.         Thought Content: Thought content normal.         Judgment: Judgment normal.      Comments: Patient has normal mood and affect.  Thought content and judgment are stable.  No signs of vascular dementia.  Behavior is normal.     PLAN : Patient is a 74-year-old male who is in for a follow-up visit today and had blood work rechecked, his blood pressure and vitals are stable.  I also reviewed his last chest x-ray and he recently did see pain management and urology.  His recent chest x-ray is stable and urology will follow-up in 6 months.  Patient had blood work drawn today and will be notified of the results in 3 days.  Further recommendations will be made at that time.  He also had a low white blood cell count last visit and that will be rechecked.  Patient is stable and will follow-up in 4 to 6 months pending the blood work results.    Problem List Items Addressed This Visit       Vitamin D deficiency    Primary hypertension    Malaise and fatigue     Other Visit Diagnoses       Dyslipidemia                     Tanmay Rosas,

## 2025-03-27 LAB
25(OH)D3+25(OH)D2 SERPL-MCNC: 37 NG/ML (ref 30–100)
ALBUMIN SERPL-MCNC: 4.2 G/DL (ref 3.6–5.1)
ALP SERPL-CCNC: 34 U/L (ref 35–144)
ALT SERPL-CCNC: 11 U/L (ref 9–46)
ANION GAP SERPL CALCULATED.4IONS-SCNC: 7 MMOL/L (CALC) (ref 7–17)
AST SERPL-CCNC: 15 U/L (ref 10–35)
BILIRUB SERPL-MCNC: 0.6 MG/DL (ref 0.2–1.2)
BUN SERPL-MCNC: 19 MG/DL (ref 7–25)
CALCIUM SERPL-MCNC: 9.1 MG/DL (ref 8.6–10.3)
CHLORIDE SERPL-SCNC: 105 MMOL/L (ref 98–110)
CHOLEST SERPL-MCNC: 128 MG/DL
CHOLEST/HDLC SERPL: 2.4 (CALC)
CO2 SERPL-SCNC: 27 MMOL/L (ref 20–32)
CREAT SERPL-MCNC: 0.78 MG/DL (ref 0.7–1.28)
EGFRCR SERPLBLD CKD-EPI 2021: 95 ML/MIN/1.73M2
ERYTHROCYTE [DISTWIDTH] IN BLOOD BY AUTOMATED COUNT: 13 % (ref 11–15)
GLUCOSE SERPL-MCNC: 94 MG/DL (ref 65–99)
HCT VFR BLD AUTO: 46 % (ref 38.5–50)
HDLC SERPL-MCNC: 54 MG/DL
HGB BLD-MCNC: 15.5 G/DL (ref 13.2–17.1)
LDLC SERPL CALC-MCNC: 59 MG/DL (CALC)
MCH RBC QN AUTO: 30.8 PG (ref 27–33)
MCHC RBC AUTO-ENTMCNC: 33.7 G/DL (ref 32–36)
MCV RBC AUTO: 91.3 FL (ref 80–100)
NONHDLC SERPL-MCNC: 74 MG/DL (CALC)
PLATELET # BLD AUTO: 161 THOUSAND/UL (ref 140–400)
PMV BLD REES-ECKER: 10.1 FL (ref 7.5–12.5)
POTASSIUM SERPL-SCNC: 4.2 MMOL/L (ref 3.5–5.3)
PROT SERPL-MCNC: 6.2 G/DL (ref 6.1–8.1)
RBC # BLD AUTO: 5.04 MILLION/UL (ref 4.2–5.8)
SODIUM SERPL-SCNC: 139 MMOL/L (ref 135–146)
TRIGL SERPL-MCNC: 72 MG/DL
WBC # BLD AUTO: 3.9 THOUSAND/UL (ref 3.8–10.8)

## 2025-03-27 NOTE — RESULT ENCOUNTER NOTE
Blood sugar, kidney and liver function are normal         white blood cell count is normal at 3900         red blood cell count is normal    platelet count  is  normal      cholesterol is very good at 128       triglycerides are normal at 72        vitamin D is normal at 37          blood work looks very good   and normal        keep up the good work

## 2025-04-24 ENCOUNTER — TELEPHONE (OUTPATIENT)
Dept: INFUSION THERAPY | Facility: CLINIC | Age: 75
End: 2025-04-24
Payer: MEDICARE

## 2025-04-29 DIAGNOSIS — M54.16 LUMBAR NEURITIS: Primary | ICD-10-CM

## 2025-05-12 DIAGNOSIS — R09.89 CHEST CONGESTION: ICD-10-CM

## 2025-05-12 DIAGNOSIS — R05.1 ACUTE COUGH: ICD-10-CM

## 2025-05-12 RX ORDER — BROMPHENIRAMINE MALEATE, PSEUDOEPHEDRINE HYDROCHLORIDE, AND DEXTROMETHORPHAN HYDROBROMIDE 2; 30; 10 MG/5ML; MG/5ML; MG/5ML
5 SYRUP ORAL 4 TIMES DAILY PRN
Qty: 120 ML | Refills: 1 | Status: SHIPPED | OUTPATIENT
Start: 2025-05-12 | End: 2025-05-22

## 2025-05-12 RX ORDER — CEPHALEXIN 500 MG/1
500 CAPSULE ORAL 2 TIMES DAILY
Qty: 20 CAPSULE | Refills: 0 | Status: SHIPPED | OUTPATIENT
Start: 2025-05-12 | End: 2025-05-22

## 2025-05-21 ENCOUNTER — APPOINTMENT (OUTPATIENT)
Dept: PRIMARY CARE | Facility: CLINIC | Age: 75
End: 2025-05-21
Payer: MEDICARE

## 2025-05-22 ENCOUNTER — HOSPITAL ENCOUNTER (OUTPATIENT)
Dept: PAIN MEDICINE | Facility: CLINIC | Age: 75
Discharge: HOME | End: 2025-05-22
Payer: MEDICARE

## 2025-05-22 VITALS
OXYGEN SATURATION: 96 % | TEMPERATURE: 97.5 F | HEART RATE: 63 BPM | BODY MASS INDEX: 27.67 KG/M2 | DIASTOLIC BLOOD PRESSURE: 76 MMHG | WEIGHT: 204 LBS | SYSTOLIC BLOOD PRESSURE: 130 MMHG | RESPIRATION RATE: 18 BRPM

## 2025-05-22 DIAGNOSIS — M54.16 LUMBAR NEURITIS: ICD-10-CM

## 2025-05-22 PROCEDURE — 62323 NJX INTERLAMINAR LMBR/SAC: CPT | Performed by: ANESTHESIOLOGY

## 2025-05-22 PROCEDURE — 2500000004 HC RX 250 GENERAL PHARMACY W/ HCPCS (ALT 636 FOR OP/ED): Mod: JZ | Performed by: ANESTHESIOLOGY

## 2025-05-22 PROCEDURE — 2500000005 HC RX 250 GENERAL PHARMACY W/O HCPCS: Mod: JZ | Performed by: ANESTHESIOLOGY

## 2025-05-22 RX ORDER — SODIUM CHLORIDE 9 MG/ML
INJECTION, SOLUTION INTRAMUSCULAR; INTRAVENOUS; SUBCUTANEOUS AS NEEDED
Status: COMPLETED | OUTPATIENT
Start: 2025-05-22 | End: 2025-05-22

## 2025-05-22 RX ORDER — LIDOCAINE HYDROCHLORIDE 5 MG/ML
INJECTION, SOLUTION INFILTRATION; INTRAVENOUS AS NEEDED
Status: COMPLETED | OUTPATIENT
Start: 2025-05-22 | End: 2025-05-22

## 2025-05-22 RX ORDER — TRIAMCINOLONE ACETONIDE 40 MG/ML
INJECTION, SUSPENSION INTRA-ARTICULAR; INTRAMUSCULAR AS NEEDED
Status: COMPLETED | OUTPATIENT
Start: 2025-05-22 | End: 2025-05-22

## 2025-05-22 RX ADMIN — TRIAMCINOLONE ACETONIDE 40 MG: 40 INJECTION, SUSPENSION INTRA-ARTICULAR; INTRAMUSCULAR at 16:12

## 2025-05-22 RX ADMIN — LIDOCAINE HYDROCHLORIDE 3 ML: 5 INJECTION, SOLUTION INFILTRATION at 16:12

## 2025-05-22 RX ADMIN — SODIUM CHLORIDE 5 ML: 9 INJECTION, SOLUTION INTRAMUSCULAR; INTRAVENOUS; SUBCUTANEOUS at 16:12

## 2025-05-22 ASSESSMENT — ENCOUNTER SYMPTOMS
SEIZURES: 0
DIZZINESS: 0
DIARRHEA: 0
NUMBNESS: 1
BLOOD IN STOOL: 0
VOMITING: 0
WEAKNESS: 0
ARTHRALGIAS: 0
DIFFICULTY URINATING: 0
NECK STIFFNESS: 0
CHEST TIGHTNESS: 0
NAUSEA: 0
NECK PAIN: 0
SPEECH DIFFICULTY: 0
CONSTIPATION: 0
BACK PAIN: 1
SHORTNESS OF BREATH: 0
EYE DISCHARGE: 0
FEVER: 0
DIAPHORESIS: 0
WHEEZING: 0
COUGH: 0
CHILLS: 0

## 2025-05-22 ASSESSMENT — PAIN - FUNCTIONAL ASSESSMENT
PAIN_FUNCTIONAL_ASSESSMENT: 0-10
PAIN_FUNCTIONAL_ASSESSMENT: 0-10

## 2025-05-22 ASSESSMENT — PAIN SCALES - GENERAL
PAINLEVEL_OUTOF10: 2
PAINLEVEL_OUTOF10: 2

## 2025-05-22 NOTE — H&P
History Of Present Illness  Edilson Live is a 74 y.o. male presenting with lumbar neuritis.     Past Medical History  Medical History[1]    Surgical History  Surgical History[2]     Social History  He reports that he has never smoked. He has never been exposed to tobacco smoke. He has never used smokeless tobacco. He reports current alcohol use of about 5.0 standard drinks of alcohol per week. Drug use questions deferred to the physician.    Family History  Family History[3]     Allergies  Chlorhexidin-isopropyl alcohol    Review of Systems   Constitutional:  Negative for chills, diaphoresis and fever.   HENT:  Negative for ear discharge and tinnitus.    Eyes:  Negative for discharge.   Respiratory:  Negative for cough, chest tightness, shortness of breath and wheezing.    Cardiovascular:  Negative for chest pain.   Gastrointestinal:  Negative for blood in stool, constipation, diarrhea, nausea and vomiting.   Endocrine: Negative for polyuria.   Genitourinary:  Negative for difficulty urinating.   Musculoskeletal:  Positive for back pain and gait problem. Negative for arthralgias, neck pain and neck stiffness.   Skin:  Negative for rash.   Neurological:  Positive for numbness. Negative for dizziness, seizures, speech difficulty and weakness.        Physical Exam  Constitutional:       Appearance: Normal appearance.   HENT:      Head: Normocephalic.      Nose: Nose normal.      Mouth/Throat:      Mouth: Mucous membranes are moist.      Pharynx: Oropharynx is clear.   Eyes:      Extraocular Movements: Extraocular movements intact.      Conjunctiva/sclera: Conjunctivae normal.      Pupils: Pupils are equal, round, and reactive to light.   Cardiovascular:      Rate and Rhythm: Normal rate.   Pulmonary:      Effort: Pulmonary effort is normal. No respiratory distress.      Breath sounds: No wheezing.   Chest:      Chest wall: No tenderness.   Abdominal:      Palpations: Abdomen is soft.   Musculoskeletal:      Cervical  back: No rigidity.   Skin:     General: Skin is warm and dry.      Findings: No rash.   Neurological:      Mental Status: He is alert and oriented to person, place, and time.      Sensory: Sensory deficit present.      Motor: Weakness present.      Gait: Gait abnormal.   Psychiatric:         Mood and Affect: Mood normal.         Behavior: Behavior normal.          Last Recorded Vitals  Blood pressure 130/76, pulse 68, temperature 36.4 °C (97.5 °F), resp. rate 16, weight 92.5 kg (204 lb), SpO2 95%.       Assessment/Plan   1. Lumbar neuritis  Epidural Steroid Injection    Epidural Steroid Injection    FL pain management    FL pain management           Vicenet Duke MD         [1]   Past Medical History:  Diagnosis Date    Acute upper respiratory infection, unspecified 11/20/2017    Acute URI    Bicipital tendinitis, left shoulder     Bicipital tendinitis of left shoulder    Effusion, right knee     Effusion of knee joint right    Encounter for removal of sutures     Visit for suture removal    Other benign neoplasm of skin of scalp and neck     Papilloma of skin of neck    Other conditions influencing health status     Myalgia and myositis    Other conditions influencing health status     Neck sprain and strain    Other specified disorders of the skin and subcutaneous tissue     Dysplastic skin lesion    Overweight     Overweight (BMI 25.0-29.9)    Personal history of other (healed) physical injury and trauma     History of sprain of knee    Personal history of other diseases of the circulatory system     History of hypertension    Personal history of other diseases of the musculoskeletal system and connective tissue     History of low back pain    Personal history of other diseases of the respiratory system     History of pneumothorax    Radiculopathy, cervical region     Brachial neuritis    Reaction to severe stress, unspecified     Stress    Sprain of unspecified parts of thorax, initial encounter     Sprain  thoracic region    Strain of muscle, fascia and tendon at neck level, initial encounter 07/10/2018    Strain of neck muscle, initial encounter   [2]   Past Surgical History:  Procedure Laterality Date    KNEE ARTHROSCOPY W/ DEBRIDEMENT  07/12/2016    Arthroscopy Knee    OTHER SURGICAL HISTORY  01/07/2020    Colonoscopy    OTHER SURGICAL HISTORY  01/07/2020    Hip replacement    OTHER SURGICAL HISTORY  01/07/2020    Back surgery   [3]   Family History  Problem Relation Name Age of Onset    Arthritis Other multiple family members     Diabetes Other multiple family members     Hypertension Other multiple family members     Stroke Other multiple family members     Other (cardiac disorder) Other multiple family members     Lung disease Other multiple family members

## 2025-06-03 ENCOUNTER — APPOINTMENT (OUTPATIENT)
Dept: PRIMARY CARE | Facility: CLINIC | Age: 75
End: 2025-06-03
Payer: MEDICARE

## 2025-06-03 VITALS
HEART RATE: 66 BPM | OXYGEN SATURATION: 96 % | SYSTOLIC BLOOD PRESSURE: 133 MMHG | DIASTOLIC BLOOD PRESSURE: 79 MMHG | BODY MASS INDEX: 26.45 KG/M2 | WEIGHT: 195 LBS

## 2025-06-03 DIAGNOSIS — M47.16 LUMBAR SPONDYLOSIS WITH MYELOPATHY: ICD-10-CM

## 2025-06-03 DIAGNOSIS — M54.16 LUMBAR NEURITIS: ICD-10-CM

## 2025-06-03 DIAGNOSIS — K44.9 HIATAL HERNIA: ICD-10-CM

## 2025-06-03 DIAGNOSIS — M51.9 LUMBOSACRAL DISC DISEASE: ICD-10-CM

## 2025-06-03 DIAGNOSIS — K21.00 GASTROESOPHAGEAL REFLUX DISEASE WITH ESOPHAGITIS WITHOUT HEMORRHAGE: Primary | ICD-10-CM

## 2025-06-03 PROCEDURE — 3078F DIAST BP <80 MM HG: CPT | Performed by: FAMILY MEDICINE

## 2025-06-03 PROCEDURE — 3075F SYST BP GE 130 - 139MM HG: CPT | Performed by: FAMILY MEDICINE

## 2025-06-03 PROCEDURE — 1159F MED LIST DOCD IN RCRD: CPT | Performed by: FAMILY MEDICINE

## 2025-06-03 PROCEDURE — 99214 OFFICE O/P EST MOD 30 MIN: CPT | Performed by: FAMILY MEDICINE

## 2025-06-03 PROCEDURE — 1160F RVW MEDS BY RX/DR IN RCRD: CPT | Performed by: FAMILY MEDICINE

## 2025-06-03 PROCEDURE — 1036F TOBACCO NON-USER: CPT | Performed by: FAMILY MEDICINE

## 2025-06-03 RX ORDER — OMEPRAZOLE 40 MG/1
CAPSULE, DELAYED RELEASE ORAL
Qty: 60 CAPSULE | Refills: 3 | Status: SHIPPED | OUTPATIENT
Start: 2025-06-03

## 2025-06-03 ASSESSMENT — ENCOUNTER SYMPTOMS: DEPRESSION: 0

## 2025-06-03 NOTE — PROGRESS NOTES
Subjective   Patient ID: Edilson Live is a 74 y.o. male who presents for GERD and Follow-up (Pt also think he has a hernia).    HPI : Patient is a 74-year-old male who is being evaluated today for some epigastric discomfort and reflux esophagitis.  He states sometimes certain foods upset his stomach and other times it is not food related.  He had been taking pantoprazole but did not think it was helping and he may need to have an upper endoscopy or upper GI series x-ray.  Patient also had a spinal nerve block for his lumbosacral disc disease last week and does think it is helped but he also has to be cautious playing golf so he does not strain his back muscles.  He has had a previous lumbar spine surgery in the past 12 years ago.  Patient also worries about his blood pressure and other health issues.    Review of Systems  : ROS :10 systems were reviewed and the information is included in the HPI and no additional review of systems is indicated.    Objective   /79 (BP Location: Left arm, Patient Position: Sitting, BP Cuff Size: Adult)   Pulse 66   Wt 88.5 kg (195 lb)   SpO2 96%   BMI 26.45 kg/m²     Physical Exam  Vitals and nursing note reviewed.   Constitutional:       Appearance: Normal appearance.      Comments: Patient is alert and oriented x3.   No acute distress   HENT:      Head: Normocephalic.      Right Ear: Tympanic membrane and external ear normal.      Left Ear: Tympanic membrane and external ear normal.      Ears:      Comments: Ears are patent bilaterally and TMs are clear.     Nose: Nose normal.      Mouth/Throat:      Mouth: Mucous membranes are moist.      Pharynx: Oropharynx is clear.      Comments: Mouth is moist, tongue is midline.  No posterior pharyngeal erythema.  Eyes:      Extraocular Movements: Extraocular movements intact.      Conjunctiva/sclera: Conjunctivae normal.      Pupils: Pupils are equal, round, and reactive to light.      Comments: No visual disturbance, does have his  eyes examined once a year.   Neck:      Comments: No carotid bruits, no thyromegaly, no cervical adenopathy.  Occasional neck spasm and restriction of motion secondary to stress and tension.  Cardiovascular:      Rate and Rhythm: Normal rate and regular rhythm.      Pulses: Normal pulses.      Heart sounds: Normal heart sounds.      Comments: Patient denies chest pain and no palpitations.  Heart rhythm is stable S1 and S2 are noted, no ectopics.  Pulmonary:      Effort: Pulmonary effort is normal.      Breath sounds: Normal breath sounds.      Comments: Patient denies any coughing or wheezing.  Lungs are clear to auscultation.    Abdominal:      General: Bowel sounds are normal.      Palpations: Abdomen is soft.      Comments: Abdomen is soft and patient does get some epigastric discomfort and gastroesophageal reflux.  He did not think pantoprazole was helping and I will have him try omeprazole twice daily for 2 weeks.  No flank tenderness.  No suprapubic pain.  Positive bowel sounds .  Patient did have a colonoscopy   1 / 2024 .  No abdominal guarding and no rebound tenderness.   Genitourinary:     Comments: Patient denies dysuria, no hematuria, no nocturia, denies flank pain.  Musculoskeletal:         General: Tenderness present. Normal range of motion.      Cervical back: Normal range of motion.      Comments: Had nerve block for  lower back last week and is stable.  History of a previous lumbar back surgery 12 years ago.  Degenerative disc disease.  Age-related arthritis in the joints.  Restriction of motion cervical and lumbar spines due to  degenerative arthritis and  muscle spasm.   Skin:     General: Skin is warm.      Comments: There is no bruising, no erythema, no skin lesions noted, no rashes.   Neurological:      General: No focal deficit present.      Mental Status: He is alert and oriented to person, place, and time. Mental status is at baseline.      Comments: No focal neurosensory deficits are  noted.  Patient denies any peripheral neuropathy.  Coordination and gait are stable.  Normal muscle strength upper and lower extremities.   Psychiatric:         Mood and Affect: Mood normal.         Behavior: Behavior normal.         Thought Content: Thought content normal.         Judgment: Judgment normal.      Comments: Patient has normal mood and affect.  Does have some increased anxiety about health issues like gastroesophageal reflux and also his back problem.  Thought content and judgment are stable.  No signs of vascular dementia.  Behavior is normal.     PLAN : Patient is a 74-year-old male who is relatively healthy but has some complaints of epigastric pain and reflux esophagitis.  Patient will try omeprazole 40 mg twice daily and stop the pantoprazole that did not seem to help.  We did discuss having upper endoscopy or an upper GI x-ray and patient will wait 3 weeks and see how he feels.  Blood pressure and other vitals are stable and he did have a nerve block for his lumbosacral back problem and he will continue to follow-up with pain management and see me in a few months as needed.    Assessment/Plan

## 2025-06-28 LAB — PSA SERPL-MCNC: 2.53 NG/ML

## 2025-07-07 PROBLEM — R21 GROIN RASH: Status: RESOLVED | Noted: 2023-04-03 | Resolved: 2025-07-07

## 2025-07-07 PROBLEM — T14.8XXA BRUISING: Status: RESOLVED | Noted: 2023-09-05 | Resolved: 2025-07-07

## 2025-07-07 PROBLEM — G89.29 CHRONIC PAIN OF LEFT GROIN: Status: RESOLVED | Noted: 2023-04-03 | Resolved: 2025-07-07

## 2025-07-07 PROBLEM — M70.71 BURSITIS OF HIP, RIGHT: Status: RESOLVED | Noted: 2023-04-03 | Resolved: 2025-07-07

## 2025-07-07 PROBLEM — S16.1XXA CERVICAL STRAIN: Status: RESOLVED | Noted: 2023-04-03 | Resolved: 2025-07-07

## 2025-07-07 PROBLEM — J20.9 ACUTE BRONCHITIS: Status: RESOLVED | Noted: 2023-04-03 | Resolved: 2025-07-07

## 2025-07-07 PROBLEM — S39.011A: Status: RESOLVED | Noted: 2023-04-03 | Resolved: 2025-07-07

## 2025-07-07 PROBLEM — J01.00 ACUTE MAXILLARY SINUSITIS: Status: RESOLVED | Noted: 2023-04-03 | Resolved: 2025-07-07

## 2025-07-07 PROBLEM — J40 BRONCHITIS: Status: RESOLVED | Noted: 2023-04-03 | Resolved: 2025-07-07

## 2025-07-07 PROBLEM — S20.212A: Status: RESOLVED | Noted: 2023-04-03 | Resolved: 2025-07-07

## 2025-07-07 PROBLEM — M25.552 HIP PAIN, ACUTE, LEFT: Status: RESOLVED | Noted: 2023-04-03 | Resolved: 2025-07-07

## 2025-07-07 PROBLEM — R10.32 CHRONIC PAIN OF LEFT GROIN: Status: RESOLVED | Noted: 2023-04-03 | Resolved: 2025-07-07

## 2025-07-07 PROBLEM — M77.8 HAND TENDONITIS: Status: RESOLVED | Noted: 2023-04-03 | Resolved: 2025-07-07

## 2025-07-07 PROBLEM — M54.16 ACUTE LUMBAR RADICULOPATHY: Status: RESOLVED | Noted: 2023-04-03 | Resolved: 2025-07-07

## 2025-07-07 PROBLEM — U07.1 COVID-19: Status: RESOLVED | Noted: 2023-12-05 | Resolved: 2025-07-07

## 2025-07-07 PROBLEM — R21 RASH OF BACK: Status: RESOLVED | Noted: 2023-04-03 | Resolved: 2025-07-07

## 2025-07-07 PROBLEM — H69.93 DYSFUNCTION OF BOTH EUSTACHIAN TUBES: Status: RESOLVED | Noted: 2023-04-03 | Resolved: 2025-07-07

## 2025-07-08 ENCOUNTER — APPOINTMENT (OUTPATIENT)
Dept: UROLOGY | Facility: CLINIC | Age: 75
End: 2025-07-08
Payer: MEDICARE

## 2025-07-09 ENCOUNTER — APPOINTMENT (OUTPATIENT)
Dept: UROLOGY | Facility: CLINIC | Age: 75
End: 2025-07-09
Payer: MEDICARE

## 2025-07-09 VITALS
WEIGHT: 189.2 LBS | HEIGHT: 72 IN | SYSTOLIC BLOOD PRESSURE: 151 MMHG | TEMPERATURE: 97.7 F | HEART RATE: 62 BPM | BODY MASS INDEX: 25.63 KG/M2 | DIASTOLIC BLOOD PRESSURE: 84 MMHG

## 2025-07-09 DIAGNOSIS — K40.20 NON-RECURRENT BILATERAL INGUINAL HERNIA WITHOUT OBSTRUCTION OR GANGRENE: ICD-10-CM

## 2025-07-09 DIAGNOSIS — R97.20 ELEVATED PSA: Primary | ICD-10-CM

## 2025-07-09 PROCEDURE — 3008F BODY MASS INDEX DOCD: CPT | Performed by: STUDENT IN AN ORGANIZED HEALTH CARE EDUCATION/TRAINING PROGRAM

## 2025-07-09 PROCEDURE — G2211 COMPLEX E/M VISIT ADD ON: HCPCS | Performed by: STUDENT IN AN ORGANIZED HEALTH CARE EDUCATION/TRAINING PROGRAM

## 2025-07-09 PROCEDURE — 99213 OFFICE O/P EST LOW 20 MIN: CPT | Performed by: STUDENT IN AN ORGANIZED HEALTH CARE EDUCATION/TRAINING PROGRAM

## 2025-07-09 PROCEDURE — 1159F MED LIST DOCD IN RCRD: CPT | Performed by: STUDENT IN AN ORGANIZED HEALTH CARE EDUCATION/TRAINING PROGRAM

## 2025-07-09 PROCEDURE — 3079F DIAST BP 80-89 MM HG: CPT | Performed by: STUDENT IN AN ORGANIZED HEALTH CARE EDUCATION/TRAINING PROGRAM

## 2025-07-09 PROCEDURE — 1036F TOBACCO NON-USER: CPT | Performed by: STUDENT IN AN ORGANIZED HEALTH CARE EDUCATION/TRAINING PROGRAM

## 2025-07-09 PROCEDURE — 1160F RVW MEDS BY RX/DR IN RCRD: CPT | Performed by: STUDENT IN AN ORGANIZED HEALTH CARE EDUCATION/TRAINING PROGRAM

## 2025-07-09 PROCEDURE — 3077F SYST BP >= 140 MM HG: CPT | Performed by: STUDENT IN AN ORGANIZED HEALTH CARE EDUCATION/TRAINING PROGRAM

## 2025-07-09 RX ORDER — SILDENAFIL 25 MG/1
25 TABLET, FILM COATED ORAL AS NEEDED
COMMUNITY

## 2025-07-09 ASSESSMENT — ENCOUNTER SYMPTOMS
DEPRESSION: 0
OCCASIONAL FEELINGS OF UNSTEADINESS: 0
LOSS OF SENSATION IN FEET: 0

## 2025-07-09 ASSESSMENT — PATIENT HEALTH QUESTIONNAIRE - PHQ9
1. LITTLE INTEREST OR PLEASURE IN DOING THINGS: NOT AT ALL
SUM OF ALL RESPONSES TO PHQ9 QUESTIONS 1 AND 2: 0
2. FEELING DOWN, DEPRESSED OR HOPELESS: NOT AT ALL

## 2025-07-09 NOTE — PROGRESS NOTES
Chief complaint:  Follow-up (W/PSA)  Referring physician:  No ref. provider found     SUBJECTIVE:  HPI:  Edilson Live is a 74 y.o. male with a history of HTN, HLD, GERD, OA with chronic LBP, Dupuytren's contracture, on ppx asa 81mg, bl inguinal hernia repairs who presents for follow up of elevated PSA.    7/9 - PSA 2.53.  No LUTS.  Some right groin discomfort with exertion, curious if recurrent hernia.  Chronic left inguinal erythematous rash.  3/20/25 - Has had stable PSA on screening around 1.8 for years.  Seen by [facility], had 2 PSA results in the 3 range.  Was recommended biopsy, coming for another opinion.  Unfortunately I do not have the U PSA values to review.  No family history of prostate cancer or breast cancer.  Sister had some kind of kidney thing that has not caused her any long term issues.  Prior DREs wnl.  No prior biopsies.  Maybe one UTI prior, no gross hematuria.  One UA with microscopic blood, repeat negative.  Never smoker.  No LUTS, nocturia x0-1.  Mild-moderate ED well controlled with sildenafil 80mg prn.  Denies curvature.  No imaging available to review prostate size.    Medical history:   has a past medical history of Acute upper respiratory infection, unspecified (11/20/2017), Bicipital tendinitis, left shoulder, Effusion, right knee, Encounter for removal of sutures, Other benign neoplasm of skin of scalp and neck, Other conditions influencing health status, Other conditions influencing health status, Other specified disorders of the skin and subcutaneous tissue, Overweight, Personal history of other (healed) physical injury and trauma, Personal history of other diseases of the circulatory system, Personal history of other diseases of the musculoskeletal system and connective tissue, Personal history of other diseases of the respiratory system, Radiculopathy, cervical region, Reaction to severe stress, unspecified, Sprain of unspecified parts of thorax, initial encounter, and Strain of muscle,  fascia and tendon at neck level, initial encounter (07/10/2018).   Surgical history:   has a past surgical history that includes Other surgical history (01/07/2020); Other surgical history (01/07/2020); Other surgical history (01/07/2020); and Knee arthroscopy w/ debridement (07/12/2016).  Family history:  family history includes Arthritis in an other family member; Diabetes in an other family member; Hypertension in an other family member; Lung disease in an other family member; Stroke in an other family member; cardiac disorder in an other family member.  Social history:   reports that he has never smoked. He has never been exposed to tobacco smoke. He has never used smokeless tobacco. He reports current alcohol use of about 5.0 standard drinks of alcohol per week. Drug use questions deferred to the physician.    Medications:    Current Outpatient Medications   Medication Instructions    albuterol (Ventolin HFA) 90 mcg/actuation inhaler 2 puffs, inhalation, Every 4 hours PRN    albuterol 90 mcg/actuation inhaler 2 puffs, inhalation, Every 6 hours PRN    aspirin 81 mg EC tablet 1 tablet, Daily    celecoxib (CELEBREX) 200 mg, oral, Daily    cholecalciferol (Vitamin D-3) 25 MCG (1000 UT) capsule 1 capsule, Daily    ciclopirox (Penlac) 8 % solution     clotrimazole-betamethasone (Lotrisone) cream Topical, 2 times daily    gabapentin (NEURONTIN) 100 mg, oral, 3 times daily    lisinopril 20 mg, oral, Daily    omeprazole (PriLOSEC) 40 mg DR capsule Do not crush or chew.  Take BID  X 2 weeks  then  One  Daily as directed.    sildenafil (VIAGRA) 25 mg, As needed    simvastatin (ZOCOR) 40 mg, oral, Daily, as directed      Allergies:    RX Allergies[1]     ROS:  14-point review of systems negative except as noted above.    OBJECTIVE:  Visit Vitals  /84   Pulse 62   Temp 36.5 °C (97.7 °F)   Body mass index is 25.66 kg/m².    Physical exam  General:  No acute distress  HEENT:  EOMI  CV:  Regular rate  Pulm:  Nonlabored  "respirations  Abd:  Soft, non-distended  :  Circumcised phallus, bl descended testes without masses, no inguinal hernias, left groin erythematous rash with associated varicose veins nontender  MSK:  No contractures  Neuro:  Motor intact  Psych:  Appropriate affect    Labs:    Lab Results   Component Value Date    WBC 3.9 03/26/2025    HGB 15.5 03/26/2025    HCT 46.0 03/26/2025     03/26/2025    ALT 11 03/26/2025    AST 15 03/26/2025     03/26/2025    K 4.2 03/26/2025     03/26/2025    CREATININE 0.78 03/26/2025    BUN 19 03/26/2025    CO2 27 03/26/2025   No results found for: \"URINECULTURE\"   Lab Results   Component Value Date    PSA 2.53 06/27/2025     Imaging:  All imaging discussed in HPI was independently reviewed.    ASSESSMENT:  Elevated PSA  History of inguinal hernias  Left inguinal rash    Discussed PSA reassuring, can resume annual screening.  No recurrence of hernias    PLAN:  Repeat PSA in 1 year  Consider dermatology referral if persistent rash issues - no urologic issues perceived on exam    Follow-up 1 year, PSA 1 week prior    Neil Thacker MD    Problem List Items Addressed This Visit       Bilateral inguinal hernia (BIH)     Other Visit Diagnoses         Elevated PSA    -  Primary    Relevant Orders    Prostate Specific Antigen                  [1]   Allergies  Allergen Reactions    Chlorhexidin-Isopropyl Alcohol Unknown     "

## 2025-07-22 PROBLEM — J32.9 SINUSITIS: Status: RESOLVED | Noted: 2023-04-03 | Resolved: 2025-07-22

## 2025-07-22 PROBLEM — J39.2 THROAT IRRITATION: Status: RESOLVED | Noted: 2023-04-03 | Resolved: 2025-07-22

## 2025-07-22 PROBLEM — E78.5 DYSLIPIDEMIA: Status: RESOLVED | Noted: 2025-03-26 | Resolved: 2025-07-22

## 2025-07-22 PROBLEM — J06.9 UPPER RESPIRATORY INFECTION WITH COUGH AND CONGESTION: Status: RESOLVED | Noted: 2023-04-03 | Resolved: 2025-07-22

## 2025-07-22 PROBLEM — J06.9 ACUTE URI: Status: RESOLVED | Noted: 2023-04-03 | Resolved: 2025-07-22

## 2025-07-23 ENCOUNTER — APPOINTMENT (OUTPATIENT)
Dept: CARDIOLOGY | Facility: CLINIC | Age: 75
End: 2025-07-23
Payer: MEDICARE

## 2025-07-23 VITALS
DIASTOLIC BLOOD PRESSURE: 70 MMHG | SYSTOLIC BLOOD PRESSURE: 112 MMHG | OXYGEN SATURATION: 98 % | WEIGHT: 190 LBS | BODY MASS INDEX: 25.77 KG/M2 | HEART RATE: 67 BPM

## 2025-07-23 DIAGNOSIS — T14.8XXA BRUISE: ICD-10-CM

## 2025-07-23 DIAGNOSIS — I25.10 CORONARY ARTERY CALCIFICATION: ICD-10-CM

## 2025-07-23 DIAGNOSIS — I10 BENIGN HYPERTENSION: ICD-10-CM

## 2025-07-23 DIAGNOSIS — E78.2 MIXED HYPERLIPIDEMIA: Primary | ICD-10-CM

## 2025-07-23 PROCEDURE — 1036F TOBACCO NON-USER: CPT | Performed by: INTERNAL MEDICINE

## 2025-07-23 PROCEDURE — 99214 OFFICE O/P EST MOD 30 MIN: CPT | Performed by: INTERNAL MEDICINE

## 2025-07-23 PROCEDURE — 3074F SYST BP LT 130 MM HG: CPT | Performed by: INTERNAL MEDICINE

## 2025-07-23 PROCEDURE — 3078F DIAST BP <80 MM HG: CPT | Performed by: INTERNAL MEDICINE

## 2025-07-23 PROCEDURE — G2211 COMPLEX E/M VISIT ADD ON: HCPCS | Performed by: INTERNAL MEDICINE

## 2025-07-23 PROCEDURE — 93000 ELECTROCARDIOGRAM COMPLETE: CPT | Performed by: INTERNAL MEDICINE

## 2025-07-23 PROCEDURE — 1160F RVW MEDS BY RX/DR IN RCRD: CPT | Performed by: INTERNAL MEDICINE

## 2025-07-23 PROCEDURE — 1159F MED LIST DOCD IN RCRD: CPT | Performed by: INTERNAL MEDICINE

## 2025-07-23 RX ORDER — ESOMEPRAZOLE MAGNESIUM 40 MG/1
40 CAPSULE, DELAYED RELEASE ORAL
COMMUNITY

## 2025-07-23 NOTE — PROGRESS NOTES
Chief Complaint:   Annual Exam and Hypertension     History Of Present Illness:    Edilson Live is a 74 y.o. male presenting with cv dz.   Patient denies chest pain/SOB/palpitations/dizziness/lightheadedness/edema/claudication  Active-walks/golfs /rec center  Notes easy bruising  Last Recorded Vitals:  Vitals:    07/23/25 1005 07/23/25 1025   BP: 120/70 112/70   BP Location: Left arm    Patient Position: Sitting    BP Cuff Size: Adult    Pulse: 67    SpO2: 98%    Weight: 86.2 kg (190 lb)             Allergies:  Chlorhexidin-isopropyl alcohol    Outpatient Medications:  Current Outpatient Medications   Medication Instructions    albuterol (Ventolin HFA) 90 mcg/actuation inhaler 2 puffs, inhalation, Every 4 hours PRN    albuterol 90 mcg/actuation inhaler 2 puffs, inhalation, Every 6 hours PRN    aspirin 81 mg EC tablet 1 tablet, Daily    celecoxib (CELEBREX) 200 mg, oral, Daily    cholecalciferol (Vitamin D-3) 25 MCG (1000 UT) capsule 1 capsule, Daily    ciclopirox (Penlac) 8 % solution     clotrimazole-betamethasone (Lotrisone) cream Topical, 2 times daily    esomeprazole (NEXIUM) 40 mg, Daily before breakfast    gabapentin (NEURONTIN) 100 mg, oral, 3 times daily    lisinopril 20 mg, oral, Daily    omeprazole (PriLOSEC) 40 mg DR capsule Do not crush or chew.  Take BID  X 2 weeks  then  One  Daily as directed.    sildenafil (VIAGRA) 25 mg, As needed    simvastatin (ZOCOR) 40 mg, oral, Daily, as directed       Physical Exam:  Constitutional:       Appearance: Healthy appearance. Not in distress.   Neck:      Vascular: No JVR. JVD normal.   Pulmonary:      Effort: Pulmonary effort is normal.      Breath sounds: Normal breath sounds. No wheezing. No rhonchi. No rales.   Chest:      Chest wall: Not tender to palpatation.   Cardiovascular:      PMI at left midclavicular line. Normal rate. Regular rhythm. Normal S1. Normal S2.       Murmurs: There is no murmur.      No gallop.  No click. No rub.   Pulses:     Intact distal  "pulses.   Edema:     Peripheral edema absent.   Abdominal:      General: Bowel sounds are normal.      Palpations: Abdomen is soft.      Tenderness: There is no abdominal tenderness.   Musculoskeletal: Normal range of motion.         General: No tenderness. Skin:     General: Skin is warm and dry.   Neurological:      General: No focal deficit present.      Mental Status: Alert and oriented to person, place and time.        Last Labs:  CBC -  Lab Results   Component Value Date    WBC 3.9 03/26/2025    HGB 15.5 03/26/2025    HCT 46.0 03/26/2025    MCV 91.3 03/26/2025     03/26/2025       CMP -  Lab Results   Component Value Date    CALCIUM 9.1 03/26/2025    PROT 6.2 03/26/2025    ALBUMIN 4.2 03/26/2025    AST 15 03/26/2025    ALT 11 03/26/2025    ALKPHOS 34 (L) 03/26/2025    BILITOT 0.6 03/26/2025       LIPID PANEL -   Lab Results   Component Value Date    CHOL 128 03/26/2025    TRIG 72 03/26/2025    HDL 54 03/26/2025    CHHDL 2.4 03/26/2025    VLDL 11 11/26/2024   Ldl=59           RENAL FUNCTION PANEL -   Lab Results   Component Value Date    GLUCOSE 94 03/26/2025     03/26/2025    K 4.2 03/26/2025     03/26/2025    CO2 27 03/26/2025    ANIONGAP 7 03/26/2025    BUN 19 03/26/2025    CREATININE 0.78 03/26/2025    GFRMALE >90 04/04/2023    CALCIUM 9.1 03/26/2025    ALBUMIN 4.2 03/26/2025        No results found for: \"BNP\", \"HGBA1C\"              Lab review: I have personally reviewed the laboratory result(s)       Problem List Items Addressed This Visit       Hyperlipidemia - Primary    Overview   On moderate intensity statin  3/2025 lipids excellent         Coronary artery calcification    Overview   355 Agatston units per 12/2021 CT   On ASA / statin   No angina or ischemic EKG changes   2/2022 stress echo with good exercise tolerance / no ischemia           Relevant Orders    ECG 12 Lead (Completed)    Benign hypertension    Overview   BP well controlled  3/2025 BMP OK         Bruise    Overview "   3/2025 H/H and PLT OK  Decrease ASA to QOD            Change aspirin to every other day-brusing    Rai Grimm, DO

## 2025-07-30 ENCOUNTER — APPOINTMENT (OUTPATIENT)
Dept: PRIMARY CARE | Facility: CLINIC | Age: 75
End: 2025-07-30
Payer: MEDICARE

## 2025-08-19 ENCOUNTER — APPOINTMENT (OUTPATIENT)
Dept: PRIMARY CARE | Facility: CLINIC | Age: 75
End: 2025-08-19
Payer: MEDICARE

## 2025-08-19 ENCOUNTER — HOSPITAL ENCOUNTER (OUTPATIENT)
Dept: RADIOLOGY | Facility: CLINIC | Age: 75
Discharge: HOME | End: 2025-08-19
Payer: MEDICARE

## 2025-08-19 VITALS
BODY MASS INDEX: 25.6 KG/M2 | RESPIRATION RATE: 16 BRPM | HEIGHT: 72 IN | OXYGEN SATURATION: 98 % | WEIGHT: 189 LBS | TEMPERATURE: 98.7 F | SYSTOLIC BLOOD PRESSURE: 122 MMHG | DIASTOLIC BLOOD PRESSURE: 80 MMHG | HEART RATE: 66 BPM

## 2025-08-19 DIAGNOSIS — R53.83 MALAISE AND FATIGUE: ICD-10-CM

## 2025-08-19 DIAGNOSIS — I10 BENIGN HYPERTENSION: ICD-10-CM

## 2025-08-19 DIAGNOSIS — E78.2 MIXED HYPERLIPIDEMIA: ICD-10-CM

## 2025-08-19 DIAGNOSIS — K29.70 GASTRITIS WITH INTESTINAL METAPLASIA OF STOMACH: ICD-10-CM

## 2025-08-19 DIAGNOSIS — R10.13 EPIGASTRIC PAIN: ICD-10-CM

## 2025-08-19 DIAGNOSIS — K21.00 GASTROESOPHAGEAL REFLUX DISEASE WITH ESOPHAGITIS WITHOUT HEMORRHAGE: Primary | ICD-10-CM

## 2025-08-19 DIAGNOSIS — K31.A0 GASTRITIS WITH INTESTINAL METAPLASIA OF STOMACH: ICD-10-CM

## 2025-08-19 DIAGNOSIS — R53.81 MALAISE AND FATIGUE: ICD-10-CM

## 2025-08-19 PROCEDURE — 74177 CT ABD & PELVIS W/CONTRAST: CPT

## 2025-08-19 PROCEDURE — 74177 CT ABD & PELVIS W/CONTRAST: CPT | Performed by: RADIOLOGY

## 2025-08-19 PROCEDURE — 1160F RVW MEDS BY RX/DR IN RCRD: CPT | Performed by: FAMILY MEDICINE

## 2025-08-19 PROCEDURE — 3079F DIAST BP 80-89 MM HG: CPT | Performed by: FAMILY MEDICINE

## 2025-08-19 PROCEDURE — 2550000001 HC RX 255 CONTRASTS: Performed by: INTERNAL MEDICINE

## 2025-08-19 PROCEDURE — 1036F TOBACCO NON-USER: CPT | Performed by: FAMILY MEDICINE

## 2025-08-19 PROCEDURE — 99214 OFFICE O/P EST MOD 30 MIN: CPT | Performed by: FAMILY MEDICINE

## 2025-08-19 PROCEDURE — 1126F AMNT PAIN NOTED NONE PRSNT: CPT | Performed by: FAMILY MEDICINE

## 2025-08-19 PROCEDURE — 3008F BODY MASS INDEX DOCD: CPT | Performed by: FAMILY MEDICINE

## 2025-08-19 PROCEDURE — 1159F MED LIST DOCD IN RCRD: CPT | Performed by: FAMILY MEDICINE

## 2025-08-19 PROCEDURE — 3074F SYST BP LT 130 MM HG: CPT | Performed by: FAMILY MEDICINE

## 2025-08-19 RX ADMIN — IOHEXOL 75 ML: 350 INJECTION, SOLUTION INTRAVENOUS at 13:31

## 2025-08-19 ASSESSMENT — PAIN SCALES - GENERAL: PAINLEVEL_OUTOF10: 0-NO PAIN

## 2025-08-20 LAB
CHOLEST SERPL-MCNC: 116 MG/DL
CHOLEST/HDLC SERPL: 2.1 (CALC)
ERYTHROCYTE [DISTWIDTH] IN BLOOD BY AUTOMATED COUNT: 12.8 % (ref 11–15)
HCT VFR BLD AUTO: 45.5 % (ref 38.5–50)
HDLC SERPL-MCNC: 54 MG/DL
HGB BLD-MCNC: 15.4 G/DL (ref 13.2–17.1)
LDLC SERPL CALC-MCNC: 48 MG/DL (CALC)
MCH RBC QN AUTO: 32 PG (ref 27–33)
MCHC RBC AUTO-ENTMCNC: 33.8 G/DL (ref 32–36)
MCV RBC AUTO: 94.4 FL (ref 80–100)
NONHDLC SERPL-MCNC: 62 MG/DL (CALC)
PLATELET # BLD AUTO: 170 THOUSAND/UL (ref 140–400)
PMV BLD REES-ECKER: 9.5 FL (ref 7.5–12.5)
RBC # BLD AUTO: 4.82 MILLION/UL (ref 4.2–5.8)
TRIGL SERPL-MCNC: 56 MG/DL
TSH SERPL-ACNC: 0.71 MIU/L (ref 0.4–4.5)
WBC # BLD AUTO: 4.4 THOUSAND/UL (ref 3.8–10.8)

## 2025-08-25 ENCOUNTER — OFFICE VISIT (OUTPATIENT)
Dept: PAIN MEDICINE | Facility: CLINIC | Age: 75
End: 2025-08-25
Payer: MEDICARE

## 2025-08-25 VITALS
HEART RATE: 55 BPM | SYSTOLIC BLOOD PRESSURE: 161 MMHG | WEIGHT: 189 LBS | OXYGEN SATURATION: 98 % | BODY MASS INDEX: 25.63 KG/M2 | DIASTOLIC BLOOD PRESSURE: 75 MMHG | TEMPERATURE: 96.1 F | RESPIRATION RATE: 15 BRPM

## 2025-08-25 DIAGNOSIS — M51.26 LUMBAR HERNIATED DISC: Primary | ICD-10-CM

## 2025-08-25 DIAGNOSIS — M96.1 LUMBAR POSTLAMINECTOMY SYNDROME: ICD-10-CM

## 2025-08-25 DIAGNOSIS — M54.16 LUMBAR NEURITIS: ICD-10-CM

## 2025-08-25 PROCEDURE — 99212 OFFICE O/P EST SF 10 MIN: CPT | Performed by: ANESTHESIOLOGY

## 2025-08-25 ASSESSMENT — PAIN DESCRIPTION - DESCRIPTORS: DESCRIPTORS: DISCOMFORT

## 2025-08-25 ASSESSMENT — ENCOUNTER SYMPTOMS
OCCASIONAL FEELINGS OF UNSTEADINESS: 0
LOSS OF SENSATION IN FEET: 0

## 2025-08-25 ASSESSMENT — PAIN - FUNCTIONAL ASSESSMENT: PAIN_FUNCTIONAL_ASSESSMENT: 0-10

## 2025-08-25 ASSESSMENT — PAIN SCALES - GENERAL
PAINLEVEL_OUTOF10: 10-WORST PAIN EVER
PAINLEVEL_OUTOF10: 1

## 2025-10-01 ENCOUNTER — APPOINTMENT (OUTPATIENT)
Dept: PRIMARY CARE | Facility: CLINIC | Age: 75
End: 2025-10-01
Payer: MEDICARE

## 2026-07-13 ENCOUNTER — APPOINTMENT (OUTPATIENT)
Dept: UROLOGY | Facility: CLINIC | Age: 76
End: 2026-07-13
Payer: MEDICARE

## 2026-07-21 ENCOUNTER — APPOINTMENT (OUTPATIENT)
Dept: CARDIOLOGY | Facility: CLINIC | Age: 76
End: 2026-07-21
Payer: MEDICARE